# Patient Record
Sex: MALE | URBAN - METROPOLITAN AREA
[De-identification: names, ages, dates, MRNs, and addresses within clinical notes are randomized per-mention and may not be internally consistent; named-entity substitution may affect disease eponyms.]

---

## 2017-09-07 ENCOUNTER — RADIANT APPOINTMENT (OUTPATIENT)
Dept: GENERAL RADIOLOGY | Facility: CLINIC | Age: 32
End: 2017-09-07
Attending: INTERNAL MEDICINE

## 2017-09-07 DIAGNOSIS — R76.11 POSITIVE PPD: ICD-10-CM

## 2017-09-07 PROCEDURE — 99207 XR CHEST 1 VIEW, EMPLOYEE HEALTH: CPT | Performed by: RADIOLOGY

## 2017-10-24 ENCOUNTER — RADIANT APPOINTMENT (OUTPATIENT)
Dept: GENERAL RADIOLOGY | Facility: CLINIC | Age: 32
End: 2017-10-24
Attending: PREVENTIVE MEDICINE
Payer: COMMERCIAL

## 2017-10-24 ENCOUNTER — OFFICE VISIT (OUTPATIENT)
Dept: ORTHOPEDICS | Facility: CLINIC | Age: 32
End: 2017-10-24
Payer: COMMERCIAL

## 2017-10-24 VITALS — WEIGHT: 226.2 LBS | BODY MASS INDEX: 32.38 KG/M2 | HEIGHT: 70 IN

## 2017-10-24 DIAGNOSIS — M54.16 PAIN, RADICULAR, LUMBAR: ICD-10-CM

## 2017-10-24 DIAGNOSIS — M54.50 LUMBAGO: ICD-10-CM

## 2017-10-24 DIAGNOSIS — M54.41 LOW BACK PAIN WITH RIGHT-SIDED SCIATICA, UNSPECIFIED BACK PAIN LATERALITY, UNSPECIFIED CHRONICITY: Primary | ICD-10-CM

## 2017-10-24 PROCEDURE — 72100 X-RAY EXAM L-S SPINE 2/3 VWS: CPT | Performed by: RADIOLOGY

## 2017-10-24 PROCEDURE — 99203 OFFICE O/P NEW LOW 30 MIN: CPT | Performed by: PREVENTIVE MEDICINE

## 2017-10-24 RX ORDER — DICLOFENAC SODIUM 75 MG/1
75 TABLET, DELAYED RELEASE ORAL 2 TIMES DAILY PRN
Qty: 30 TABLET | Refills: 1 | Status: SHIPPED | OUTPATIENT
Start: 2017-10-24 | End: 2018-02-27

## 2017-10-24 NOTE — PROGRESS NOTES
"HISTORY OF PRESENT ILLNESS  Mr. Hodges is a pleasant 32 year old year old male who presents to clinic today with right leg pain and numbness  Sage explains that since this past summer his right leg started to bother him that caused numbness and pain in right leg  Location: low back  Quality:  achy pain    Severity: 5/10 at worst    Duration: 3 months  Timing: occurs intermittently    Modifying factors:  resting and non-use makes it better, movement and use makes it worse  Associated signs & symptoms: right foot numbness and pain in leg  Previous similar pain: yes  Exercise: lifting weights and cardiovascular on machine  Additional history: as documented    MEDICAL HISTORY  There is no problem list on file for this patient.      No current outpatient prescriptions on file.       No Known Allergies    No family history on file.    Additional medical/Social/Surgical histories reviewed in [employer] and updated as appropriate.     REVIEW OF SYSTEMS (10/24/2017)  10 point ROS of systems including Constitutional, Eyes, Respiratory, Cardiovascular, Gastroenterology, Genitourinary, Integumentary, Musculoskeletal, Psychiatric were all negative except for pertinent positives noted in my HPI.     PHYSICAL EXAM  Vitals:    10/24/17 0749   Weight: 102.6 kg (226 lb 3.2 oz)   Height: 1.778 m (5' 10\")     Vital Signs: Ht 1.778 m (5' 10\")  Wt 102.6 kg (226 lb 3.2 oz)  BMI 32.46 kg/m2 Patient declined being weighed. Body mass index is 32.46 kg/(m^2).    General  - normal appearance, in no obvious distress  CV  - normal peripheral perfusion  Pulm  - normal respiratory pattern, non-labored  Musculoskeletal - lumbar spine  - stance: normal gait without limp, no obvious leg length discrepancy, normal heel and toe walk  - inspection: normal bone and joint alignment, no obvious scoliosis  - palpation: no paravertebral or bony tenderness  - ROM: flexion exacerbates pain and is limited, abnormal limited extension, sidebending, rotation  - " strength: lower extremities 5/5 in all planes  - special tests:  (+) straight leg raise- some pain in low back  (+) slump test- pain and stiffness in low back  Neuro  - patellar and Achilles DTRs 2+ bilaterally, right lower extremity sensory deficit throughout L5 distribution, grossly normal coordination, normal muscle tone  Skin  - no ecchymosis, erythema, warmth, or induration, no obvious rash  Psych  - interactive, appropriate, normal mood and affect    ASSESSMENT & PLAN  33 yo male with lumbar pain and ddd  Lumbar xray shows narrowing at L5/s1  Lumbar MRI ordered after discussion due to longevity of symptoms  HEP given  Start voltaren  Consider BEAR    Yohan Gonzalez MD, CAQSM

## 2017-10-24 NOTE — MR AVS SNAPSHOT
After Visit Summary   10/24/2017    Sage Hodges    MRN: [id number]           Patient Information     Date Of Birth          1985        Visit Information        Provider Department      10/24/2017 7:40 AM Yohan Gonzalez MD [facility]        Today's Diagnoses     Low back pain with right-sided sciatica, unspecified back pain laterality, unspecified chronicity    -  1    Pain, radicular, lumbar           Follow-ups after your visit        Your next 10 appointments already scheduled     Nov 06, 2017  5:00 PM CST   MR LUMBAR SPINE W/O CONTRAST with MGMR1   [facility] ([facility]    [address] [address] [place] [id number] [phone]           Take your medicines as usual, unless your doctor tells you not to. Bring a list of your current medicines to your exam (including vitamins, minerals and over-the-counter drugs). Also bring the results of similar scans you may have had.  Please remove any body piercings and hair extensions before you arrive.  Follow your doctor s orders. If you do not, we may have to postpone your exam.  You will not have contrast for this exam. You do not need to do anything special to prepare.  The MRI machine uses a strong magnet. Please wear clothes without metal (snaps, zippers). A sweatsuit works well, or we may give you a hospital gown.   **IMPORTANT** THE INSTRUCTIONS BELOW ARE ONLY FOR THOSE PATIENTS WHO HAVE BEEN TOLD THEY WILL RECEIVE SEDATION OR GENERAL ANESTHESIA DURING THEIR MRI PROCEDURE:  IF YOU WILL RECEIVE SEDATION (take medicine to help you relax during your exam):   You must get the medicine from your doctor before you arrive. Bring the medicine to the exam. Do not take it at home.   Arrive one hour early. Bring someone who can take you home after the test. Your medicine will make you sleepy. After the exam, you may not drive, take a bus or take a taxi by yourself.   No eating 8 hours  before your exam. You may have clear liquids up until 4 hours before your exam. (Clear liquids include water, clear tea, black coffee and fruit juice without pulp.)  IF YOU WILL RECEIVE ANESTHESIA (be asleep for your exam):   Arrive 1 1/2 hours early. Bring someone who can take you home after the test. You may not drive, take a bus or take a taxi by yourself.   No eating 8 hours before your exam. You may have clear liquids up until 4 hours before your exam. (Clear liquids include water, clear tea, black coffee and fruit juice without pulp.)   You will spend four to five hours in the recovery room.  Please call the Imaging Department at your exam site with any questions.            Nov 07, 2017  8:40 AM CST   Return Visit with Yohan Gonzalez MD   [facility] ([facility])    [address] [place] [id number] [phone]              Future tests that were ordered for you today     Open Future Orders        Priority Expected Expires Ordered    MRI Lumbar spine w/o contrast Routine  10/24/2018 10/24/2017            Who to contact     If you have questions or need follow up information about today's clinic visit or your schedule please contact [facility] directly at [phone].  Normal or non-critical lab and imaging results will be communicated to you by Office Depothart, letter or phone within 4 business days after the clinic has received the results. If you do not hear from us within 7 days, please contact the clinic through Office Depothart or phone. If you have a critical or abnormal lab result, we will notify you by phone as soon as possible.  Submit refill requests through Taptu or call your pharmacy and they will forward the refill request to us. Please allow 3 business days for your refill to be completed.          Additional Information About Your Visit        Taptu Information     Taptu is an electronic gateway that provides easy, online access  "to your medical records. With Diversion, you can request a clinic appointment, read your test results, renew a prescription or communicate with your care team.     To sign up for Diversion visit the website at [email or web address]   You will be asked to enter the access code listed below, as well as some personal information. Please follow the directions to create your username and password.     Your access code is: [email or web address]  Expires: 2018  8:43 AM     Your access code will  in 90 days. If you need help or a new code, please contact your [facility] or call [phone] for assistance.        Care EveryWhere ID     This is your Care EveryWhere ID. This could be used by other organizations to access your [facility] medical records  [id number]        Your Vitals Were     Height BMI (Body Mass Index)                1.778 m (5' 10\") 32.46 kg/m2           Blood Pressure from Last 3 Encounters:   No data found for BP    Weight from Last 3 Encounters:   10/24/17 102.6 kg (226 lb 3.2 oz)                 Today's Medication Changes          These changes are accurate as of: 10/24/17  8:46 AM.  If you have any questions, ask your nurse or doctor.               Start taking these medicines.        Dose/Directions    diclofenac 75 MG EC tablet   Commonly known as:  VOLTAREN   Used for:  Low back pain with right-sided sciatica, unspecified back pain laterality, unspecified chronicity, Pain, radicular, lumbar   Started by:  Yohan Gonzalez MD        Dose:  75 mg   Take 1 tablet (75 mg) by mouth 2 times daily as needed for moderate pain   Quantity:  30 tablet   Refills:  1            Where to get your medicines      These medications were sent to [facility] - [place], [place] - [address], [address]  [address], [facility] [address]     Phone:  [phone]     diclofenac 75 MG EC tablet                Primary Care Provider Office " Phone # Fax #    [facility] [phone] [address]  [facility] [address]        Equal Access to Services     JESUS FIGUEROA : Hadjose kia [place] diyao Sofranciscaali, waaxda luqadaha, qaybta kaalmada adesusida, alf blanco laRigobertophuc farrell. So [facility] [phone].    ATENCIÓN: Si habla español, tiene a rockwell disposición servicios gratuitos de asistencia lingüística. Llame al [phone].    We comply with applicable federal civil rights laws and [place] laws. We do not discriminate on the basis of race, color, national origin, age, disability, sex, sexual orientation, or gender identity.            Thank you!     Thank you for choosing [facility]  for your care. Our goal is always to provide you with excellent care. Hearing back from our patients is one way we can continue to improve our services. Please take a few minutes to complete the written survey that you may receive in the mail after your visit with us. Thank you!             Your Updated Medication List - Protect others around you: Learn how to safely use, store and throw away your medicines at www.disposemymeds.org.          This list is accurate as of: 10/24/17  8:46 AM.  Always use your most recent med list.                   Brand Name Dispense Instructions for use Diagnosis    diclofenac 75 MG EC tablet    VOLTAREN    30 tablet    Take 1 tablet (75 mg) by mouth 2 times daily as needed for moderate pain    Low back pain with right-sided sciatica, unspecified back pain laterality, unspecified chronicity, Pain, radicular, lumbar

## 2017-11-06 ENCOUNTER — RADIANT APPOINTMENT (OUTPATIENT)
Dept: MRI IMAGING | Facility: CLINIC | Age: 32
End: 2017-11-06
Attending: PREVENTIVE MEDICINE
Payer: COMMERCIAL

## 2017-11-06 DIAGNOSIS — M54.16 PAIN, RADICULAR, LUMBAR: ICD-10-CM

## 2017-11-06 DIAGNOSIS — M54.41 LOW BACK PAIN WITH RIGHT-SIDED SCIATICA, UNSPECIFIED BACK PAIN LATERALITY, UNSPECIFIED CHRONICITY: ICD-10-CM

## 2017-11-06 PROCEDURE — 72148 MRI LUMBAR SPINE W/O DYE: CPT | Performed by: RADIOLOGY

## 2017-11-07 ENCOUNTER — OFFICE VISIT (OUTPATIENT)
Dept: ORTHOPEDICS | Facility: CLINIC | Age: 32
End: 2017-11-07
Payer: COMMERCIAL

## 2017-11-07 VITALS
DIASTOLIC BLOOD PRESSURE: 102 MMHG | SYSTOLIC BLOOD PRESSURE: 151 MMHG | HEART RATE: 78 BPM | WEIGHT: 226 LBS | BODY MASS INDEX: 32.35 KG/M2 | HEIGHT: 70 IN

## 2017-11-07 DIAGNOSIS — M51.26 HERNIATED LUMBAR INTERVERTEBRAL DISC: Primary | ICD-10-CM

## 2017-11-07 PROCEDURE — 99213 OFFICE O/P EST LOW 20 MIN: CPT | Performed by: PREVENTIVE MEDICINE

## 2017-11-07 ASSESSMENT — PAIN SCALES - GENERAL: PAINLEVEL: SEVERE PAIN (7)

## 2017-11-07 NOTE — PATIENT INSTRUCTIONS
Thanks for coming today.  Ortho/Sports Medicine Clinic  [address] [place] [address]    To schedule future appointments in Ortho Clinic, you may call [phone].    To schedule ordered imaging by your provider:   Call Central Imaging [phone]    To schedule an injection ordered by your provider:  Call Central Imaging Injection scheduling line: [phone]  CartCrunchhart available online at:  [email or web address]    Please call if any further questions or concerns ([phone]).  Clinic hours 8 am to 5 pm.    Return to clinic (call) if symptoms worsen or fail to improve.

## 2017-11-07 NOTE — MR AVS SNAPSHOT
After Visit Summary   2017    Sage Hodges    MRN: [id number]           Patient Information     Date Of Birth          1985        Visit Information        Provider Department      2017 8:40 AM Yohan Gonzalez MD [facility]        Today's Diagnoses     Herniated lumbar intervertebral disc    -  1      Care Instructions    Thanks for coming today.  Ortho/Sports Medicine Clinic  [address] [place] [address]    To schedule future appointments in Ortho Clinic, you may call [phone].    To schedule ordered imaging by your provider:   Call Central Imaging [phone]    To schedule an injection ordered by your provider:  Call Central Imaging Injection scheduling line: [phone]  ClickDiagnosticshart available online at:  [email or web address]    Please call if any further questions or concerns ([phone]).  Clinic hours 8 am to 5 pm.    Return to clinic (call) if symptoms worsen or fail to improve.          Follow-ups after your visit        Your next 10 appointments already scheduled     2017  8:00 AM CST   XR LUMBAR EPIDURAL INJECTION with MGXR3, MG NEURO RAD   [facility] ([facility])    [address] [place] [address] [phone]           For nerve root injection, please send or bring copies of any MRIs or other scans you have had.  Bring a list of your current medicines to your exam. (Include vitamins, minerals and over-the-counter medicines.) Leave your valuables at home.  Plan to have someone drive you home afterward.  Stop taking the following medicines (but talk to your doctor first):   If you take blood thinners, you may need to stop taking them a few days before treatment. Talk to your doctor before stopping these medicines.Stop taking Coumadin (warfarin) 3 days before treatment. Restart the day after treatment.   If you take Plavix, Ticlid, Pletal or Persantine, please  ask your doctor if you should stop these medicines. You may need extra tests on the morning of your scan.   If you take Xarelto (Rivaroxaban), you may need to stop taking it 24 hours before treatment. Talk to your doctor before stopping this medicine. Restart the day after treatment.  You may take your other medicines as normal.  Stop all food and drink (including water) 3 hours before your test or treatment.  Please tell the doctor:   If you are allergic to X-ray dye (contrast fluid).   If you may be pregnant.  Injections take about 30 to 45 minutes. Most people spend up to 2 hours in the clinic or hospital.  Please call the Imaging Department at your exam site with any questions.              Future tests that were ordered for you today     Open Future Orders        Priority Expected Expires Ordered    X-ray Lumbar epidural injection Routine 11/7/2017 11/7/2018 11/7/2017            Who to contact     If you have questions or need follow up information about today's clinic visit or your schedule please contact [facility] directly at [phone].  Normal or non-critical lab and imaging results will be communicated to you by Wonderswamphart, letter or phone within 4 business days after the clinic has received the results. If you do not hear from us within 7 days, please contact the clinic through Into The Glosst or phone. If you have a critical or abnormal lab result, we will notify you by phone as soon as possible.  Submit refill requests through Kijubi or call your pharmacy and they will forward the refill request to us. Please allow 3 business days for your refill to be completed.          Additional Information About Your Visit        Kijubi Information     Kijubi is an electronic gateway that provides easy, online access to your medical records. With Kijubi, you can request a clinic appointment, read your test results, renew a prescription or communicate with your care team.     To sign up for Kijubi  "visit the website at [email or web address]   You will be asked to enter the access code listed below, as well as some personal information. Please follow the directions to create your username and password.     Your access code is: [email or web address]  Expires: 2018  7:43 AM     Your access code will  in 90 days. If you need help or a new code, please contact your [facility] or call [phone] for assistance.        Care EveryWhere ID     This is your Care EveryWhere ID. This could be used by other organizations to access your [facility] medical records  [id number]        Your Vitals Were     Pulse Height BMI (Body Mass Index)             78 1.778 m (5' 10\") 32.43 kg/m2          Blood Pressure from Last 3 Encounters:   17 (!) 151/102    Weight from Last 3 Encounters:   17 102.5 kg (226 lb)   10/24/17 102.6 kg (226 lb 3.2 oz)               Primary Care Provider Office Phone # Fax #    [facility] [phone]       [address]  [facility] [address]        Equal Access to Services     JESUS FIGUEROA AH: Hadii aad ku hadasho Soomaali, waaxda luqadaha, qaybta kaalmada adeegyada, waxay idiin hayaan venkatesh blanco laalexis farrell. So [facility] [phone].    ATENCIÓN: Si habla español, tiene a rockwell disposición servicios gratuitos de asistencia lingüística. Llame al [phone].    We comply with applicable federal civil rights laws and [place] laws. We do not discriminate on the basis of race, color, national origin, age, disability, sex, sexual orientation, or gender identity.            Thank you!     Thank you for choosing [facility]  for your care. Our goal is always to provide you with excellent care. Hearing back from our patients is one way we can continue to improve our services. Please take a few minutes to complete the written survey that you may receive in the mail after your visit with us. Thank you!           "   Your Updated Medication List - Protect others around you: Learn how to safely use, store and throw away your medicines at www.disposemymeds.org.          This list is accurate as of: 11/7/17  9:05 AM.  Always use your most recent med list.                   Brand Name Dispense Instructions for use Diagnosis    diclofenac 75 MG EC tablet    VOLTAREN    30 tablet    Take 1 tablet (75 mg) by mouth 2 times daily as needed for moderate pain    Low back pain with right-sided sciatica, unspecified back pain laterality, unspecified chronicity, Pain, radicular, lumbar

## 2017-11-07 NOTE — PROGRESS NOTES
"HISTORY OF PRESENT ILLNESS  Mr. Hodges returns after having lumbar MRI.  Not much better, voltaren helps a little during the day.  Here to discuss MRI.   And make a plan  Additional history: as documented    MEDICAL HISTORY  There is no problem list on file for this patient.      Current Outpatient Prescriptions   Medication Sig Dispense Refill     diclofenac (VOLTAREN) 75 MG EC tablet Take 1 tablet (75 mg) by mouth 2 times daily as needed for moderate pain 30 tablet 1       No Known Allergies    No family history on file.    Additional medical/Social/Surgical histories reviewed in [facility] and updated as appropriate.     REVIEW OF SYSTEMS (11/7/2017)  10 point ROS of systems including Constitutional, Eyes, Respiratory, Cardiovascular, Gastroenterology, Genitourinary, Integumentary, Musculoskeletal, Psychiatric were all negative except for pertinent positives noted in my HPI.     PHYSICAL EXAM  Vitals:    11/07/17 0845   BP: (!) 151/102   Pulse: 78   Weight: 102.5 kg (226 lb)   Height: 1.778 m (5' 10\")     Vital Signs: BP (!) 151/102  Pulse 78  Ht 1.778 m (5' 10\")  Wt 102.5 kg (226 lb)  BMI 32.43 kg/m2 Patient declined being weighed. Body mass index is 32.43 kg/(m^2).    General  - normal appearance, in no obvious distress  CV  - normal peripheral perfusion  Pulm  - normal respiratory pattern, non-labored  Musculoskeletal - lumbar spine  - stance: normal gait without limp, no obvious leg length discrepancy, normal heel and toe walk  - inspection: normal bone and joint alignment, no obvious scoliosis  - palpation: no paravertebral or bony tenderness  - ROM: flexion exacerbates pain and is limited, abnormal limited extension, sidebending, rotation  - strength: lower extremities 5/5 in all planes  - special tests:  (+) straight leg raise- some pain in low back  (+) slump test- pain and stiffness in low back  Neuro  - patellar and Achilles DTRs 2+ bilaterally, right lower extremity sensory deficit throughout L5 " distribution, grossly normal coordination, normal muscle tone  Skin  - no ecchymosis, erythema, warmth, or induration, no obvious rash  Psych  - interactive, appropriate, normal mood and affect  Not improved    ASSESSMENT & PLAN  33 yo male with lumbar pain and ddd, not improved  MRI lumbar : herniated disc at L4/5  HEP given  Start voltaren  Ordered BEAR    Yohan Gonzalez MD, CAQSM

## 2017-11-09 ENCOUNTER — TELEPHONE (OUTPATIENT)
Dept: ORTHOPEDICS | Facility: CLINIC | Age: 32
End: 2017-11-09

## 2017-11-09 DIAGNOSIS — M51.16 LUMBAR DISC HERNIATION WITH RADICULOPATHY: Primary | ICD-10-CM

## 2017-11-09 NOTE — TELEPHONE ENCOUNTER
[facility] Call Center    Phone Message    Name of Caller: Sage    Phone Number: Home number on file [phone] (home)    Best time to return call: Any    May a detailed message be left on voicemail: yes    Relation to patient: Self    Reason for Call: Other: Patient is wondering if there is an alternative medication that can help him over the weekend with his pain. He has an epdidural scheduled for Tuesday but does not think he can last until then. Please advise.      Action Taken: Message routed to:  Adult Clinics: Sports Medicine p [id number]

## 2017-11-10 RX ORDER — PREDNISONE 20 MG/1
60 TABLET ORAL DAILY
Qty: 15 TABLET | Refills: 0 | Status: SHIPPED | OUTPATIENT
Start: 2017-11-10 | End: 2017-12-05

## 2017-11-14 ENCOUNTER — RADIANT APPOINTMENT (OUTPATIENT)
Dept: GENERAL RADIOLOGY | Facility: CLINIC | Age: 32
End: 2017-11-14
Attending: PREVENTIVE MEDICINE
Payer: COMMERCIAL

## 2017-11-14 VITALS — SYSTOLIC BLOOD PRESSURE: 142 MMHG | HEART RATE: 98 BPM | DIASTOLIC BLOOD PRESSURE: 108 MMHG | OXYGEN SATURATION: 100 %

## 2017-11-14 DIAGNOSIS — M51.26 HERNIATED LUMBAR INTERVERTEBRAL DISC: ICD-10-CM

## 2017-11-14 PROCEDURE — 62323 NJX INTERLAMINAR LMBR/SAC: CPT | Performed by: RADIOLOGY

## 2017-11-14 RX ORDER — IOPAMIDOL 408 MG/ML
10 INJECTION, SOLUTION INTRATHECAL ONCE
Status: COMPLETED | OUTPATIENT
Start: 2017-11-14 | End: 2017-11-14

## 2017-11-14 RX ORDER — METHYLPREDNISOLONE ACETATE 80 MG/ML
80 INJECTION, SUSPENSION INTRA-ARTICULAR; INTRALESIONAL; INTRAMUSCULAR; SOFT TISSUE ONCE
Status: COMPLETED | OUTPATIENT
Start: 2017-11-14 | End: 2017-11-14

## 2017-11-14 RX ORDER — BUPIVACAINE HYDROCHLORIDE 5 MG/ML
10 INJECTION, SOLUTION PERINEURAL ONCE
Status: COMPLETED | OUTPATIENT
Start: 2017-11-14 | End: 2017-11-14

## 2017-11-14 RX ADMIN — IOPAMIDOL 2 ML: 408 INJECTION, SOLUTION INTRATHECAL at 07:53

## 2017-11-14 RX ADMIN — BUPIVACAINE HYDROCHLORIDE 20 MG: 5 INJECTION, SOLUTION PERINEURAL at 07:53

## 2017-11-14 RX ADMIN — METHYLPREDNISOLONE ACETATE 80 MG: 80 INJECTION, SUSPENSION INTRA-ARTICULAR; INTRALESIONAL; INTRAMUSCULAR; SOFT TISSUE at 07:54

## 2017-11-14 NOTE — PROGRESS NOTES
: Sage Hodges was seen in X-ray today for a lumbar epidural injection. Patient rated pain before procedure 3/10. After procedure patient rated pain 2-3/10. This pain level is acceptable to patient. Patient discharged home with an Uber, his Wife dropped him off.      AFTER YOU GO HOME    ? DO relax; minimize your activity for 24 hours  ? You may resume normal activity tomorrow  ? You may remove the bandage in the evening or next morning  ? You may resume bathing the next day  ? Drink at least 4 extra glasses of fluid today if not on fluid restrictions  ? DO NOT drive or operate machinery at home or at work for at least 24 hours      VISIT THE EMERGENCY ROOM OR URGENT CARE IF:    ? There is redness or swelling at the injection site  ? There is discharge from the injection site  ? You develop a temperature of 101  F or greater      ADDITIONAL INSTRUCTIONS:     ? You may resume your Coumadin or other blood thinner at your regular dose today.  Follow up with your physician to have your INR rechecked if indicated.  ? If you gain no relief from the injection after two (2) weeks, follow-up with your provider for your options.        Contacts:    During business hours from 8 to 5 pm, you may call [phone] to reach a nurse advisor at [facility].  After hours, call [facility]  [phone].  Ask for the Radiologist on-call.  Someone is on-call 24 hrs/day.  [facility] Toll Free Number   [phone]

## 2017-12-05 ENCOUNTER — TELEPHONE (OUTPATIENT)
Dept: ORTHOPEDICS | Facility: CLINIC | Age: 32
End: 2017-12-05

## 2017-12-05 DIAGNOSIS — M51.16 LUMBAR DISC HERNIATION WITH RADICULOPATHY: ICD-10-CM

## 2017-12-05 RX ORDER — PREDNISONE 20 MG/1
60 TABLET ORAL DAILY
Qty: 15 TABLET | Refills: 0 | Status: SHIPPED | OUTPATIENT
Start: 2017-12-05 | End: 2018-02-27

## 2017-12-12 ENCOUNTER — TELEPHONE (OUTPATIENT)
Dept: ORTHOPEDICS | Facility: CLINIC | Age: 32
End: 2017-12-12

## 2017-12-12 DIAGNOSIS — M51.26 HERNIATION OF INTERVERTEBRAL DISC OF LUMBAR SPINE: Primary | ICD-10-CM

## 2017-12-12 NOTE — TELEPHONE ENCOUNTER
[facility] Call Center    Phone Message    Name of Caller: Sage    Phone Number: Home number on file [phone] (home)    Best time to return call: any    May a detailed message be left on voicemail: yes    Relation to patient: Self    Reason for Call: Other: patient is requesting to speak with Dr Gonzalez regarding a referral for spinal surgery as a follow up from their last conversation.  patient is requesting to schedule with Dr JOSE MARTIN Zhao- Please advise      Action Taken: Message routed to:  Adult Clinics: Sports Medicine p [phone]

## 2017-12-13 ENCOUNTER — THERAPY VISIT (OUTPATIENT)
Dept: CHIROPRACTIC MEDICINE | Facility: CLINIC | Age: 32
End: 2017-12-13
Payer: COMMERCIAL

## 2017-12-13 DIAGNOSIS — M99.03 SEGMENTAL DYSFUNCTION OF LUMBAR REGION: Primary | ICD-10-CM

## 2017-12-13 DIAGNOSIS — M99.04 SEGMENTAL DYSFUNCTION OF SACRAL REGION: ICD-10-CM

## 2017-12-13 DIAGNOSIS — M62.830 BACK MUSCLE SPASM: ICD-10-CM

## 2017-12-13 DIAGNOSIS — M51.16 LUMBAR DISC HERNIATION WITH RADICULOPATHY: ICD-10-CM

## 2017-12-13 PROCEDURE — 97035 APP MDLTY 1+ULTRASOUND EA 15: CPT | Performed by: CHIROPRACTOR

## 2017-12-13 PROCEDURE — 99203 OFFICE O/P NEW LOW 30 MIN: CPT | Mod: 25 | Performed by: CHIROPRACTOR

## 2017-12-13 PROCEDURE — 98940 CHIROPRACT MANJ 1-2 REGIONS: CPT | Mod: AT | Performed by: CHIROPRACTOR

## 2017-12-13 NOTE — PROGRESS NOTES
"Initial Chiropractic Clinic Visit    PCP: Clinic, [facility]    Sage Hodges is a 32 year old male who is seen  as a self referral presenting with lower back pain that began in July 2017 when he was exercising at home. He is having \"leg issues\" which he describes as pain in his left calf and some numbness and tingling in his left foot, only with standing. He denies weakness in his leg. He rates the pain at 4/10 when it is bothering him. Standing and walking for more than 5 minutes make the pain worse, and goes away with sitting. He is sleeping fine at night. He denies increased pain with coughing or sneezing or changes with bowel and bladder. He saw sports med and took NSAIDs and Prednisone for 5 days, and they didn't help much. He also had an injection at L4-5 and that didn't help for much more than 10 days. This pain began when he was exercising at home. It improved but he has had leg issues off and on since then.  He had never had back or leg issues prior to July 2017.       Injury: None      Duration of Pain: 5 months   Rating of Pain at worst: 6/10  Rating of Pain Currently: 4/10  Symptoms are better with: Sitting  Symptoms are worse with: standing  Additional Features: left calf, numbness in foot       Health History  as reported by the patient:    How does the patient rate their own health:   Good    Current or past medical history:   No red flags identified    Medical allergies:  None    Past Traumas/Surgeries:  ORIF humerus left    Family History:  HTN    Medications:  None    Occupation:  Oncologist    Primary job tasks:   Computer work and Prolonged sitting    Barriers as home/work:   none                Sage was asked to complete the Oswestry Low Back Disability Index and Hua Start Back screening tool, today in the office.  Disability score: 26%. Keel Start Total Score:3 Sub Score: 1     Review of Systems  Musculoskeletal: as above  Remainder of review of systems is negative including " constitutional, CV, pulmonary, GI, Skin and Neurologic except as noted in HPI or medical history.    No past medical history on file.  No past surgical history on file.  Objective  There were no vitals taken for this visit.      GENERAL APPEARANCE: healthy, alert and no distress   GAIT: NORMAL  SKIN: no suspicious lesions or rashes  NEURO: Normal strength and tone, mentation intact and speech normal  PSYCH:  mentation appears normal and affect normal/bright    Low back exam:    Inspection:       no visible deformity in the low back    ROM:       Flexion mildly restricted with pain on left, LR causes pain on left side and mildly reduced     Tender:       Left calf        Strength:       ankle dorsiflexion 4/5 Left       ankle plantarflexion 4/5 Left       dorsiflexion of the great toe 5/5 Normal    Reflexes:       patellar (L3, L4) 2+ bilaterally       achilles tendons (S1) 2+ bilaterally    Sensation:      grossly intact throughout lower extremities    Special tests:  Milgrams - positive, Valsalva - negative, Kemps - Right negative and Left negative, SLR - Right positive, 40 degrees and Left positive, 30 degrees, Fabere - Right negative and Left negative, Yeoman's - Right negative and Left negative, Jasvir - Right negative and Left negative, Ely's - Right negative and Left negative, Heel walk - Right negative and Left negative and Toe walk -  Right negative and Left negative    Segmental spinal dysfunction/restrictions found at:L1 Extension restriction and Flexion restriction  L2 Extension restriction and Flexion restriction  L3 Extension restriction and Flexion restriction  L4 Extension restriction and Flexion restriction  L5 Extension restriction and Flexion restriction  PSIS Left posterior, restricted P to A.        Muscle spasm found in:Lumbar erector spine      Radiology: 11/6/2017 Lumbar MRI  Impression:   1. Disc bulge with superimposed central disc protrusion at L4-5  causing severe thecal sac narrowing and  mild to moderate left neural  foraminal narrowing.  2.  Mildly hypoplastic appearing sacrum without additional findings to  suggest caudal regression syndrome.    Assessment:    No diagnosis found.    RX ordered/plan of care: L4-5 lumbar disc protrusion with severe thecal sac narrowing, with associated myospasm and intersegmental dysfunction.   Anticipated outcomes: Patient is expected to get some relief with care.   Possible risks and side effects: Minimal soreness expected post-adjustment.     After discussing the risk and benefits of care, patient consented to treatment.    Prognosis: Good      Patient's condition:  Patient had restrictions pre-manipulation and Patient had decreased motion prior to manipulation    Treatment effectiveness:  Post manipulation there is better intersegmental movement and Patient claims to feel looser post manipulation      Plan:    Procedures:  Evaluation and Management:  22536 Moderate level exam 30 min    CMT:  67349 Chiropractic manipulative treatment 1-2 regions performed   Lumbar: Flexion Distraction, L1, L2, L3, L4, L5, Prone  Pelvis: Drop Table, PSIS Left , Prone    Modalities:  40846: US:  1.0 Crawley/cm squared for 8 minutes at 1.0mhz  Continuous  pulsed, Location: left lumbar L4-5 region    Therapeutic procedures:  Gave patient Ice instructions post adjustment, and instructions for acute care        Treatment plan and goals:  Goals:  Decrease this episode of acute LBP.  Centralize radicular pain.  Manage L4-5 disc herniation.     Frequency of care  Duration of care is estimated to be 6 weeks, from the initial treatment.  It is estimated that the patient will need a total of 8 visits to resolve this episode.  For the initial therapeutic trial of care, the frequency is recommended at 1-2 times per week.  A reevaluation would be clinically appropriate in 8 visits, to determine progress and further course of care.    In-Office Treatment  Evaluation  Spinal Chiropractic  Manipulative Therapy:  Trial of care - re-evaluate after 8 visits.         Recommendations:    Instructions:ice 20 minutes every other hour as needed    Follow-up:  Return to care in .       Discussed the assessment with the patient.      Disclaimer: This note consists of symbols derived from keyboarding, dictation and/or voice recognition software. As a result, there may be errors in the script that have gone undetected. Please consider this when interpreting information found in this chart.

## 2017-12-14 NOTE — TELEPHONE ENCOUNTER
Spoke to Sage, relayed information below. He will call to schedule appointment with Dr. Zhao.     All questions answered.

## 2017-12-14 NOTE — TELEPHONE ENCOUNTER
Called and left  for patient, 2nd attempt.     Put in spine referral for patient to see Dr. Zhao.     May give the patient phone number for him to schedule with Dr. Zhao if this is what patient would like to go forward with at this time.     [phone].

## 2018-01-02 NOTE — TELEPHONE ENCOUNTER
APPT INFO    Date /Time: 1/16/18 9AM   Reason for Appt: Disc Herniation of L Spine   Ref Provider/Clinic: Dr. Gonzalez   Are there internal records? Yes/No?  IF YES, list clinic names: Yes - [facility]    Imaging in pacs   Are there outside records? Yes/No? no   Patient Contact (Y/N) & Call Details: No - internal referral   Action: Chart reviewed

## 2018-01-16 ENCOUNTER — OFFICE VISIT (OUTPATIENT)
Dept: ORTHOPEDICS | Facility: CLINIC | Age: 33
End: 2018-01-16
Payer: COMMERCIAL

## 2018-01-16 ENCOUNTER — PRE VISIT (OUTPATIENT)
Dept: ORTHOPEDICS | Facility: CLINIC | Age: 33
End: 2018-01-16

## 2018-01-16 VITALS — RESPIRATION RATE: 16 BRPM | HEIGHT: 71 IN | WEIGHT: 230 LBS | BODY MASS INDEX: 32.2 KG/M2

## 2018-01-16 DIAGNOSIS — M54.16 LUMBAR RADICULOPATHY: ICD-10-CM

## 2018-01-16 DIAGNOSIS — M51.26 DISPLACEMENT OF LUMBAR INTERVERTEBRAL DISC WITHOUT MYELOPATHY: ICD-10-CM

## 2018-01-16 DIAGNOSIS — M48.062 SPINAL STENOSIS OF LUMBAR REGION WITH NEUROGENIC CLAUDICATION: Primary | ICD-10-CM

## 2018-01-16 NOTE — LETTER
1/16/2018       RE: Sage Hodges  [address]  [facility] [address]     Dear Colleague,    Thank you for referring your patient, Sage Hodges, to the [facility] at [facility]. Please see a copy of my visit note below.    REASON FOR CONSULTATION: New Patient (Herniation of intervertebral disc of lumbar spine )     REFERRING PHYSICIAN: Yohan Gonzalez   PCP:Clinic, [facility]    History of Present Illness:    32/m, symptoms started summer 2017; no symptoms prior.  Was exercising at home, then developed severe back pain x 2 days before gradually improving on its own.  Leg pain, however, started shortly after - initially R leg, not more on L leg.  Pain and numbness in left L5 pattern (lateral calf, dorsum of foot).  Symptoms have been pretty bad / disabling since November (past 2 months).    Back 0%, Leg 100%,  Left > Right  Worse: standing, walking.  Better: sitting    Previous treatment:   PT - mild relief.  Oral prednisone - mild relief.  1 BEAR - 11/14/17: L4-5 IL BEAR (lEias).  Per report, very mild relief from 3/10 to 2-3/10.  Per patient, only mild ('so-so') relief x 1 week; not dramatic.    At this point, quite unhappy with symptoms.  Activities limited.  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  Willing to consider surgical treatment if condition amenable.        Oswestry (KATALINA) Questionnaire    OSWESTRY DISABILITY INDEX 1/16/2018   Section 1 - Pain intensity 1   Section 2 - Personal care (washing, dressing, etc.)  0   Section 3 - Lifting 1   Section 4 - Walking 2   Section 5 - Sitting 0   Section 6 - Standing 4   Section 7 - Sleeping 0   Section 8 - Sex life (if applicable) 0   Section 9 - Social life 3   Section 10 - Traveling 1   Count 10   Sum 12   Oswestry Score (%) 24   SECTION 1-PAIN INTENSITY The pain is very mild at the moment.   SECTION 2-PERSONAL CARE (WASHING,DRESSING,ETC.)  I can look after myself normally without causing additional pain.   SECTION 3-LIFTING I can lift heavy weights but it gives me additional pain.   SECTION 4-WALKING Pain prevents me from walking more than a quarter of a mile.   SECTION 5-SITTING I can sit in any chair as long as I like.   SECTION 6-STANDING Pain prevents me from standing for more than 10 minutes.   SECTION 7-SLEEPING My sleep is never interrupted by pain.   SECTION 8-SEX LIFE (IF APPLICABLE) My sex life is normal and causes no additional pain.   SECTION 9-SOCIAL LIFE Pain has restricted my social life and I do not go out as often.   SECTION 10-TRAVELING I can travel anywhere but it gives me additional pain.   Oswestry Disability Index: Count 10   KATALINA: Total Score = SUM (total for answered questions) 12   Computed Oswestry Score 24 (%)        Back pain 0, R leg 0, L leg 6.5 (when walking/standing).    PROMIS-10 Scores  Global Mental Health Score: (P) 16  Global Physical Health Score: (P) 15  PROMIS TOTAL - SUBSCORES: (P) 31    ROS:  A 12-point review of systems was completed and is negative except for otherwise noted above in the history of present illness.    Med Hx:  No past medical history on file.  Per patient, good health overall.    Surg Hx:  No past surgical history on file.   (+) ORIF left humerus shaft fx (~2000) - slipped on ice.    Allergies:  No Known Allergies    Meds:  Current Outpatient Prescriptions   Medication     predniSONE (DELTASONE) 20 MG tablet     diclofenac (VOLTAREN) 75 MG EC tablet     No current facility-administered medications for this visit.        Fam Hx:  No family history on file.    P/S Hx:  Social History   Substance Use Topics     Smoking status: Not on file     Smokeless tobacco: Not on file     Alcohol use Not on file     Originally from [place].  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  , lives with wife and 2 1/2 yr ould son.  (-) smoking.  (-) alcohol.      Physical  "Exam:  Very pleasant, healthy appearing, alert, oriented x 3, cooperative.  Normal mood and affect.  Not in cardiorespiratory distress.  Resp 16  Ht 1.803 m (5' 11\")  Wt 104.3 kg (230 lb)  BMI 32.08 kg/m2  Normal upright posture.    Normal gait without assistive device.  No antalgia / imbalance.  Able to walk on toes and on heels with ease.  Back: no deformity, no skin lesions or surgical scars.  No back pain / tenderness.  ROM:   Full painless extension.   Limited flexion, only able to reach down just past knees.     Neuro Exam:  Motor: 5/5 strength for all muscle groups in both LE's.  Limited active knee extension in seated position (aggravates leg pain).  Sensory:  Intact to light touch in both LE's.   Reflexes:  Knee 2+ bilat.  Ankle 1+ bilat.  (-) Babinski, (-) clonus.  (+) seated SLR bilat.    Lower Extremity:  Equal leg lengths, full pulses, (-) atrophy / asymmetry.  Full painless passive knee and ankle motion.  Straight leg raise: (-) right, (+) left.  Hip impingement: (-) right, (-) left.  GAUDENCIO/Erasto's: (-) right, (-) left.            Imaging:   Lumbar MRI 11/6/17 shows congenitally narrow lumbar spinal canal.  At L4-5, there is superimposed central disc protrusion, resulting in severe stenosis at this level.    Lumbar x-rays 10/24/17 do not show fracture, instability, deformity.  Has 5 lumbar vertebrae.    Impression:   - Severe spinal stenosis with central disc protrusion L4-5.  - Multilevel congenital lumbar stenosis.   - L>R L5 radicular pain.    Plan:   Had a very good discussion with Dr. Hodges re his symptoms, my findings and diagnoses, and treatment options.  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  For the most part, this is well-tolerated / asymptomatic.  However, has less physiologic reserve to accommodate further narrowing prior to developing nerve compression symptoms.  Has developed disc protrusion at L4-5, thus resulting in severe narrowing of canal at this level.  This is the " likely cause of his symptoms.  At this point, has had symptoms since the summer, really bad since Nov, limiting his quality of life and activities.  Tried nonop treatment (PT, BEAR, prednisone), with only mild relief.  Surgery reasonable to consider.  I recommend single-level L4-5 MIS laminectomy-diskectomy (left approach).  Goal is to make more room for the nerves.  MIS approach limits collateral muscle damage and is preferable.  Fusion not indicated in absence of instability / deformity.  Prophylactic decompression of other levels not indicated because likely asymptomatic, and only will increase risk of complications or other postlaminectomy sequelae.  Discussed rationale, risks, benefits, alternatives.  He elects to proceed.  Discussed postop: off work minimum 2 weeks, preferably 4 weeks.  No lifting > 10 lbs, avoid excessive bending/twisting x 6 weeks.    Surg request placed.  Outpatient surgery (ASC).  As he is young and healthy, no need for PAC referral.  H&P c/o primary care clinic.    All questions and concerns were answered to the patient's apparent satisfaction before leaving the clinic.     Total visit time > 30 mins, > 50% counseling and coordination of care.      Again, thank you for allowing me to participate in the care of your patient.      Sincerely,    Randal Zhao MD

## 2018-01-16 NOTE — MR AVS SNAPSHOT
After Visit Summary   1/16/2018    Sage Hodges    MRN: [id number]           Patient Information     Date Of Birth          1985        Visit Information        Provider Department      1/16/2018 9:00 AM Randal Zhao MD [facility]        Today's Diagnoses     Spinal stenosis of lumbar region with neurogenic claudication    -  1    Displacement of lumbar intervertebral disc without myelopathy        Lumbar radiculopathy           Follow-ups after your visit        Additional Services     PAC Visit Referral (For [facility] Only)       Does this visit require an Anesthesia consult?  Yes - Evaluate for medical necessity related to one of the following conditions:  preop    H&P done by:  PAC      Please be aware that coverage of these services is subject to the terms and limitations of your health insurance plan.  Call member services at your health plan with any benefit or coverage questions.      Please bring the following to your appointment:  >>   Any x-rays, CTs or MRIs which have been performed.  Contact the facility where they were done to arrange for  prior to your scheduled appointment.  Any new CT, MRI or other procedures ordered by your specialist must be performed at a [facility] facility or coordinated by your clinic's referral office.    >>   List of current medications  >>   This referral request   >>   Any documents/labs given to you for this referral                  Who to contact     Please call your clinic at [phone] to:    Ask questions about your health    Make or cancel appointments    Discuss your medicines    Learn about your test results    Speak to your doctor   If you have compliments or concerns about an experience at your clinic, or if you wish to file a complaint, please contact [facility] Patient Relations at [phone] or email us at [email or web address]         Additional Information About Your  "Visit        STWAhart Information     MacroCure is an electronic gateway that provides easy, online access to your medical records. With MacroCure, you can request a clinic appointment, read your test results, renew a prescription or communicate with your care team.     To sign up for MacroCure visit the website at [email or web address]   You will be asked to enter the access code listed below, as well as some personal information. Please follow the directions to create your username and password.     Your access code is: [email or web address]  Expires: 2018  7:43 AM     Your access code will  in 90 days. If you need help or a new code, please contact your [facility] or call [phone] for assistance.        Care EveryWhere ID     This is your Care EveryWhere ID. This could be used by other organizations to access your [place] medical records  [id number]        Your Vitals Were     Respirations Height BMI (Body Mass Index)             16 1.803 m (5' 11\") 32.08 kg/m2          Blood Pressure from Last 3 Encounters:   17 (!) 142/108   17 (!) 151/102    Weight from Last 3 Encounters:   18 104.3 kg (230 lb)   17 102.5 kg (226 lb)   10/24/17 102.6 kg (226 lb 3.2 oz)              We Performed the Following     PAC Visit Referral (For [facility] Only)     Mariah-Operative Worksheet        Primary Care Provider Office Phone # Fax #    [facility] [phone]       [address]  [facility] [address]        Equal Access to Services     JESUS FIGUEROA : Hadii kia [place] hadashdaniel Sobalaji, waaxda luqadaha, qaybta kaalmada jon, alf farrell. So [facility] [phone].    ATENCIÓN: Si habla español, tiene a rockwell disposición servicios gratuitos de asistencia lingüística. Llame al [phone].    We comply with applicable federal civil rights laws and [place] laws. We do not discriminate on the basis of race, color, " national origin, age, disability, sex, sexual orientation, or gender identity.            Thank you!     Thank you for choosing [facility]  for your care. Our goal is always to provide you with excellent care. Hearing back from our patients is one way we can continue to improve our services. Please take a few minutes to complete the written survey that you may receive in the mail after your visit with us. Thank you!             Your Updated Medication List - Protect others around you: Learn how to safely use, store and throw away your medicines at www.disposemymeds.org.          This list is accurate as of: 1/16/18 11:59 PM.  Always use your most recent med list.                   Brand Name Dispense Instructions for use Diagnosis    diclofenac 75 MG EC tablet    VOLTAREN    30 tablet    Take 1 tablet (75 mg) by mouth 2 times daily as needed for moderate pain    Low back pain with right-sided sciatica, unspecified back pain laterality, unspecified chronicity, Pain, radicular, lumbar       predniSONE 20 MG tablet    DELTASONE    15 tablet    Take 3 tablets (60 mg) by mouth daily    Lumbar disc herniation with radiculopathy

## 2018-01-16 NOTE — NURSING NOTE
"Reason For Visit:   Chief Complaint   Patient presents with     New Patient     Herniation of intervertebral disc of lumbar spine        Primary MD: Clinic, [facility]  Ref. MD: Dr. Gonzalez     ?  No  Occupation: Physician- Oncologist .  Currently working? Yes.  Work status?  Full time.  Date of injury: 07/17  Type of injury: Exercising at home   Date of surgery: N/A  Smoker: No      Resp 16  Ht 1.803 m (5' 11\")  Wt 104.3 kg (230 lb)  BMI 32.08 kg/m2    Pain Assessment  Patient Currently in Pain: No    Oswestry (KATALINA) Questionnaire    OSWESTRY DISABILITY INDEX 1/16/2018   Section 1 - Pain intensity 1   Section 2 - Personal care (washing, dressing, etc.)  0   Section 3 - Lifting 1   Section 4 - Walking 2   Section 5 - Sitting 0   Section 6 - Standing 4   Section 7 - Sleeping 0   Section 8 - Sex life (if applicable) 0   Section 9 - Social life 3   Section 10 - Traveling 1   Count 10   Sum 12   Oswestry Score (%) 24   SECTION 1-PAIN INTENSITY The pain is very mild at the moment.   SECTION 2-PERSONAL CARE (WASHING,DRESSING,ETC.) I can look after myself normally without causing additional pain.   SECTION 3-LIFTING I can lift heavy weights but it gives me additional pain.   SECTION 4-WALKING Pain prevents me from walking more than a quarter of a mile.   SECTION 5-SITTING I can sit in any chair as long as I like.   SECTION 6-STANDING Pain prevents me from standing for more than 10 minutes.   SECTION 7-SLEEPING My sleep is never interrupted by pain.   SECTION 8-SEX LIFE (IF APPLICABLE) My sex life is normal and causes no additional pain.   SECTION 9-SOCIAL LIFE Pain has restricted my social life and I do not go out as often.   SECTION 10-TRAVELING I can travel anywhere but it gives me additional pain.   Oswestry Disability Index: Count 10   KATALINA: Total Score = SUM (total for answered questions) 12   Computed Oswestry Score 24 (%)                Visual Analog Pain Scale  Back Pain Scale 0-10: " 0  Right leg pain: 0  Left leg pain: 6.5 (when walking or standing )    Promis 10 Assessment    PROMIS 10 1/16/2018   In general, would you say your health is: Very good   In general, would you say your quality of life is: Good   In general, how would you rate your physical health? Very good   In general, how would you rate your mental health, including your mood and your ability to think? Excellent   In general, how would you rate your satisfaction with your social activities and relationships? Good   In general, please rate how well you carry out your usual social activities and roles Very good   To what extent are you able to carry out your everyday physical activities such as walking, climbing stairs, carrying groceries, or moving a chair? Moderately   How often have you been bothered by emotional problems such as feeling anxious, depressed or irritable? Never   How would you rate your fatigue on average? None   How would you rate your pain on average?   0 = No Pain  to  10 = Worst Imaginable Pain 6   In general, would you say your health is: 4   In general, would you say your quality of life is: 3   In general, how would you rate your physical health? 4   In general, how would you rate your mental health, including your mood and your ability to think? 5   In general, how would you rate your satisfaction with your social activities and relationships? 3   In general, please rate how well you carry out your usual social activities and roles. (This includes activities at home, at work and in your community, and responsibilities as a parent, child, spouse, employee, friend, etc.) 4   To what extent are you able to carry out your everyday physical activities such as walking, climbing stairs, carrying groceries, or moving a chair? 3   In the past 7 days, how often have you been bothered by emotional problems such as feeling anxious, depressed, or irritable? 1   In the past 7 days, how would you rate your fatigue on  average? 1   In the past 7 days, how would you rate your pain on average, where 0 means no pain, and 10 means worst imaginable pain? 6   Global Mental Health Score 16   Global Physical Health Score 15   PROMIS TOTAL - SUBSCORES 31                Sarah Stevens CMA

## 2018-01-18 NOTE — PROGRESS NOTES
REASON FOR CONSULTATION: New Patient (Herniation of intervertebral disc of lumbar spine )     REFERRING PHYSICIAN: Yohan Gonzalez   PCP:Clinic, [facility]    History of Present Illness:    32/m, symptoms started summer 2017; no symptoms prior.  Was exercising at home, then developed severe back pain x 2 days before gradually improving on its own.  Leg pain, however, started shortly after - initially R leg, not more on L leg.  Pain and numbness in left L5 pattern (lateral calf, dorsum of foot).  Symptoms have been pretty bad / disabling since November (past 2 months).    Back 0%, Leg 100%,  Left > Right  Worse: standing, walking.  Better: sitting    Previous treatment:   PT - mild relief.  Oral prednisone - mild relief.  1 BEAR - 11/14/17: L4-5 IL BEAR (Abielgiuseppe).  Per report, very mild relief from 3/10 to 2-3/10.  Per patient, only mild ('so-so') relief x 1 week; not dramatic.    At this point, quite unhappy with symptoms.  Activities limited.  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  Willing to consider surgical treatment if condition amenable.        Oswestry (KATALINA) Questionnaire    OSWESTRY DISABILITY INDEX 1/16/2018   Section 1 - Pain intensity 1   Section 2 - Personal care (washing, dressing, etc.)  0   Section 3 - Lifting 1   Section 4 - Walking 2   Section 5 - Sitting 0   Section 6 - Standing 4   Section 7 - Sleeping 0   Section 8 - Sex life (if applicable) 0   Section 9 - Social life 3   Section 10 - Traveling 1   Count 10   Sum 12   Oswestry Score (%) 24   SECTION 1-PAIN INTENSITY The pain is very mild at the moment.   SECTION 2-PERSONAL CARE (WASHING,DRESSING,ETC.) I can look after myself normally without causing additional pain.   SECTION 3-LIFTING I can lift heavy weights but it gives me additional pain.   SECTION 4-WALKING Pain prevents me from walking more than a quarter of a mile.   SECTION 5-SITTING I can sit in any chair as long as I like.  "  SECTION 6-STANDING Pain prevents me from standing for more than 10 minutes.   SECTION 7-SLEEPING My sleep is never interrupted by pain.   SECTION 8-SEX LIFE (IF APPLICABLE) My sex life is normal and causes no additional pain.   SECTION 9-SOCIAL LIFE Pain has restricted my social life and I do not go out as often.   SECTION 10-TRAVELING I can travel anywhere but it gives me additional pain.   Oswestry Disability Index: Count 10   KATALINA: Total Score = SUM (total for answered questions) 12   Computed Oswestry Score 24 (%)        Back pain 0, R leg 0, L leg 6.5 (when walking/standing).    PROMIS-10 Scores  Global Mental Health Score: (P) 16  Global Physical Health Score: (P) 15  PROMIS TOTAL - SUBSCORES: (P) 31    ROS:  A 12-point review of systems was completed and is negative except for otherwise noted above in the history of present illness.    Med Hx:  No past medical history on file.  Per patient, good health overall.    Surg Hx:  No past surgical history on file.   (+) ORIF left humerus shaft fx (~2000) - slipped on ice.    Allergies:  No Known Allergies    Meds:  Current Outpatient Prescriptions   Medication     predniSONE (DELTASONE) 20 MG tablet     diclofenac (VOLTAREN) 75 MG EC tablet     No current facility-administered medications for this visit.        Fam Hx:  No family history on file.    P/S Hx:  Social History   Substance Use Topics     Smoking status: Not on file     Smokeless tobacco: Not on file     Alcohol use Not on file     Originally from [place].  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  , lives with wife and 2 1/2 yr ould son.  (-) smoking.  (-) alcohol.      Physical Exam:  Very pleasant, healthy appearing, alert, oriented x 3, cooperative.  Normal mood and affect.  Not in cardiorespiratory distress.  Resp 16  Ht 1.803 m (5' 11\")  Wt 104.3 kg (230 lb)  BMI 32.08 kg/m2  Normal upright posture.    Normal gait without assistive device.  No antalgia / " imbalance.  Able to walk on toes and on heels with ease.  Back: no deformity, no skin lesions or surgical scars.  No back pain / tenderness.  ROM:   Full painless extension.   Limited flexion, only able to reach down just past knees.     Neuro Exam:  Motor: 5/5 strength for all muscle groups in both LE's.  Limited active knee extension in seated position (aggravates leg pain).  Sensory:  Intact to light touch in both LE's.   Reflexes:  Knee 2+ bilat.  Ankle 1+ bilat.  (-) Babinski, (-) clonus.  (+) seated SLR bilat.    Lower Extremity:  Equal leg lengths, full pulses, (-) atrophy / asymmetry.  Full painless passive knee and ankle motion.  Straight leg raise: (-) right, (+) left.  Hip impingement: (-) right, (-) left.  GAUDENCIO/Erasto's: (-) right, (-) left.            Imaging:   Lumbar MRI 11/6/17 shows congenitally narrow lumbar spinal canal.  At L4-5, there is superimposed central disc protrusion, resulting in severe stenosis at this level.    Lumbar x-rays 10/24/17 do not show fracture, instability, deformity.  Has 5 lumbar vertebrae.    Impression:   - Severe spinal stenosis with central disc protrusion L4-5.  - Multilevel congenital lumbar stenosis.   - L>R L5 radicular pain.    Plan:   Had a very good discussion with Dr. Hodges re his symptoms, my findings and diagnoses, and treatment options.  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  For the most part, this is well-tolerated / asymptomatic.  However, has less physiologic reserve to accommodate further narrowing prior to developing nerve compression symptoms.  Has developed disc protrusion at L4-5, thus resulting in severe narrowing of canal at this level.  This is the likely cause of his symptoms.  At this point, has had symptoms since the summer, really bad since Nov, limiting his quality of life and activities.  Tried nonop treatment (PT, BEAR, prednisone), with only mild relief.  Surgery reasonable to consider.  I recommend single-level L4-5 MIS  laminectomy-diskectomy (left approach).  Goal is to make more room for the nerves.  MIS approach limits collateral muscle damage and is preferable.  Fusion not indicated in absence of instability / deformity.  Prophylactic decompression of other levels not indicated because likely asymptomatic, and only will increase risk of complications or other postlaminectomy sequelae.  Discussed rationale, risks, benefits, alternatives.  He elects to proceed.  Discussed postop: off work minimum 2 weeks, preferably 4 weeks.  No lifting > 10 lbs, avoid excessive bending/twisting x 6 weeks.    Surg request placed.  Outpatient surgery (ASC).  As he is young and healthy, no need for PAC referral.  H&P c/o primary care clinic.    All questions and concerns were answered to the patient's apparent satisfaction before leaving the clinic.     Total visit time > 30 mins, > 50% counseling and coordination of care.    Respectfully,    Randal Zhao MD    Orthopaedic Spine Surgery  Dept Orthopaedic Surgery, [facility]  [phone] office, [phone] pager  [email or web address]  Answers for HPI/ROS submitted by the patient on 1/16/2018   General Symptoms: No  Skin Symptoms: No  HENT Symptoms: No  EYE SYMPTOMS: No  HEART SYMPTOMS: No  LUNG SYMPTOMS: No  INTESTINAL SYMPTOMS: No  URINARY SYMPTOMS: No  REPRODUCTIVE SYMPTOMS: No  SKELETAL SYMPTOMS: No  BLOOD SYMPTOMS: No  NERVOUS SYSTEM SYMPTOMS: No  MENTAL HEALTH SYMPTOMS: No

## 2018-01-23 ENCOUNTER — TELEPHONE (OUTPATIENT)
Dept: ORTHOPEDICS | Facility: CLINIC | Age: 33
End: 2018-01-23

## 2018-02-27 ENCOUNTER — APPOINTMENT (OUTPATIENT)
Dept: SURGERY | Facility: CLINIC | Age: 33
End: 2018-02-27
Payer: COMMERCIAL

## 2018-02-27 ENCOUNTER — ANESTHESIA EVENT (OUTPATIENT)
Dept: SURGERY | Facility: AMBULATORY SURGERY CENTER | Age: 33
End: 2018-02-27

## 2018-02-27 ENCOUNTER — ALLIED HEALTH/NURSE VISIT (OUTPATIENT)
Dept: SURGERY | Facility: CLINIC | Age: 33
End: 2018-02-27
Payer: COMMERCIAL

## 2018-02-27 ENCOUNTER — OFFICE VISIT (OUTPATIENT)
Dept: SURGERY | Facility: CLINIC | Age: 33
End: 2018-02-27
Payer: COMMERCIAL

## 2018-02-27 VITALS
OXYGEN SATURATION: 96 % | DIASTOLIC BLOOD PRESSURE: 84 MMHG | HEIGHT: 70 IN | TEMPERATURE: 98.1 F | SYSTOLIC BLOOD PRESSURE: 132 MMHG | WEIGHT: 231.7 LBS | HEART RATE: 87 BPM | BODY MASS INDEX: 33.17 KG/M2 | RESPIRATION RATE: 16 BRPM

## 2018-02-27 DIAGNOSIS — M51.26 LUMBAR HERNIATED DISC: ICD-10-CM

## 2018-02-27 DIAGNOSIS — Z01.818 PREOPERATIVE GENERAL PHYSICAL EXAMINATION: ICD-10-CM

## 2018-02-27 DIAGNOSIS — Z01.818 PREOPERATIVE GENERAL PHYSICAL EXAMINATION: Primary | ICD-10-CM

## 2018-02-27 LAB
ALBUMIN UR-MCNC: NEGATIVE MG/DL
ANION GAP SERPL CALCULATED.3IONS-SCNC: 5 MMOL/L (ref 3–14)
APPEARANCE UR: CLEAR
APTT PPP: 26 SEC (ref 22–37)
BILIRUB UR QL STRIP: NEGATIVE
BUN SERPL-MCNC: 18 MG/DL (ref 7–30)
CALCIUM SERPL-MCNC: 9.3 MG/DL (ref 8.5–10.1)
CHLORIDE SERPL-SCNC: 103 MMOL/L (ref 94–109)
CO2 SERPL-SCNC: 30 MMOL/L (ref 20–32)
COLOR UR AUTO: YELLOW
CREAT SERPL-MCNC: 1.08 MG/DL (ref 0.66–1.25)
ERYTHROCYTE [DISTWIDTH] IN BLOOD BY AUTOMATED COUNT: 11.9 % (ref 10–15)
GFR SERPL CREATININE-BSD FRML MDRD: 79 ML/MIN/1.7M2
GLUCOSE SERPL-MCNC: 94 MG/DL (ref 70–99)
GLUCOSE UR STRIP-MCNC: NEGATIVE MG/DL
HCT VFR BLD AUTO: 53.7 % (ref 40–53)
HGB BLD-MCNC: 18.3 G/DL (ref 13.3–17.7)
HGB UR QL STRIP: NEGATIVE
INR PPP: 0.95 (ref 0.86–1.14)
KETONES UR STRIP-MCNC: NEGATIVE MG/DL
LEUKOCYTE ESTERASE UR QL STRIP: NEGATIVE
MCH RBC QN AUTO: 27.7 PG (ref 26.5–33)
MCHC RBC AUTO-ENTMCNC: 34.1 G/DL (ref 31.5–36.5)
MCV RBC AUTO: 81 FL (ref 78–100)
NITRATE UR QL: NEGATIVE
PH UR STRIP: 6 PH (ref 5–7)
PLATELET # BLD AUTO: 229 10E9/L (ref 150–450)
POTASSIUM SERPL-SCNC: 4.2 MMOL/L (ref 3.4–5.3)
RBC # BLD AUTO: 6.6 10E12/L (ref 4.4–5.9)
SODIUM SERPL-SCNC: 138 MMOL/L (ref 133–144)
SOURCE: NORMAL
SP GR UR STRIP: 1.02 (ref 1–1.03)
UROBILINOGEN UR STRIP-MCNC: 0 MG/DL (ref 0–2)
WBC # BLD AUTO: 10.4 10E9/L (ref 4–11)

## 2018-02-27 NOTE — H&P
Pre-Operative H & P     CC:  Preoperative exam to assess for increased cardiopulmonary risk while undergoing surgery and anesthesia.    Date of Encounter: 2/27/2018  Primary Care Physician:  Nicole, [facility]  Sage Hodges is a 33 year old male who presents for pre-operative H & P in preparation for combined hemilaminectomy Left L4-5, with Dr. Zhao, on 3/20/18, at [facility], in diagnosis and treatment of severe spinal stenosis and herniation intervertebral disc. PAC referral for risk assessment and optimization for anesthesia. The onset was this summer 2017, after home exercise, with decreased ability to walk distance. Pain and parathesia to LLE after few minutes walking.  No urinary sx. No fever, chills.  Positive PPD treated x 9 months with clear follow up Xrays    History is obtained from the patient.     Past Medical History  No Positive PPD, treated x 9 months  Herniated lumbar disc    Past Surgical History  ORIF 2014    Hx of Blood transfusions/reactions: no     Hx of abnormal bleeding or anti-platelet use: no    Menstrual history: No LMP for male patient.    Steroid use in the last year: no    Personal or FH with difficulty with Anesthesia:  no    Prior to Admission Medications  Not currently taking prescription medications. Tylenol prn for pain       Allergies  No Known Allergies    Social History  Social History     Social History     Marital status:      Spouse name: Jim Stoddard     Number of children: 1 healthy     Occupational History     Oncology physician     Social History Main Topics     Smoking status: None     Smokeless tobacco: Not on file     Alcohol use Rare     Drug use: Not on file     Sexual activity: Not on file           Family History  Father alive and well  Mother alive: HTN  Sibling - no medical problems        ROS/MED HX    ENT/Pulmonary: Comment: Tested positive for latent TB 2011; completed 9 months of INH with follow  "up Chest Xray with no abnormality      Neurologic:     (+)other neuro parathesia LLE    Cardiovascular:  - neg cardiovascular ROS       METS/Exercise Tolerance: Comment: 8 months ago had 4 METS  Very limiting to just couple minutes since lumbar pathology    Hematologic:  - neg hematologic  ROS       Musculoskeletal:   (+)  other musculoskeletal- disc with stenosis      GI/Hepatic:  - neg GI/hepatic ROS       Renal/Genitourinary:  - ROS Renal section negative       Endo:  - neg endo ROS       Psychiatric:  - neg psychiatric ROS       Infectious Disease:  - neg infectious disease ROS       Malignancy:      - no malignancy   Other:    (+) No chance of pregnancy no H/O Chronic Pain,no other significant disability        The complete review of systems is negative other than noted in the HPI or here.   Temp: 98.1  F (36.7  C) Temp src: Oral BP: 132/84 Pulse: 87   Resp: 16 SpO2: 96 %         231 lbs 11.2 oz  5' 10\"   Body mass index is 33.25 kg/(m^2).       Physical Exam  Constitutional: Awake, alert, cooperative, no apparent distress, and appears stated age.  Eyes: Pupils equal, round and reactive to light,  sclera clear, conjunctiva normal.  HENT: Normocephalic, oral pharynx with moist mucus membranes, good dentition. No goiter appreciated.   Respiratory: Clear to auscultation bilaterally, no crackles or wheezing.  Cardiovascular: Regular rate and rhythm, normal S1 and S2, and no murmur noted.  Carotids +2, no bruits. No LE edema. Palpable pulses to radial  DP and PT arteries.   GI: Normal bowel sounds, soft, non-distended, non-tender, no masses palpated, no hepatosplenomegaly.    Lymph/Hematologic: No cervical lymphadenopathy and no supraclavicular lymphadenopathy.  Genitourinary:  deferred  Skin: Warm and dry.  No rashes at anticipated surgical site.   Musculoskeletal: Full ROM of neck. There is no redness, warmth, or swelling of the joints. Bruise left UE  Neurologic: Awake, alert, oriented to name, place and time. " Cranial nerves II-XII are grossly intact. Gait is normal.   Neuropsychiatric: Calm, cooperative. Normal affect.     Labs: (personally reviewed)    EKG: Not indicated    ASSESSMENT and PLAN  Sage Hodges is a 33 year old male scheduled to undergo combined hemilaminectomy Left L4-5, with Dr. Zhao, on 3/20/18, in diagnosis and treatment of severe spinal stenosis and herniation intervertebral disc.     Pre-operative considerations include:  1.) Cardiac: Functional status independent. Exercise tolerance > 4 METS. Cards risks: no identifiable.   RCRI = 0.4% risk of adverse cardiac event.  No further cardiac evaluation indicated    2.) Pulmonary: No sx, dx or meds. Elevated BMI - 33    3.) Heme: No bleeds or clots.       . He has the following specific operative considerations:   - RCRI : No serious cardiac risks.  0.4%  risk of major adverse cardiac event.   - Anesthesia considerations:  Refer to PAC assessment in anesthesia records  - THOM # of risks 1/8 = low risk  - Risk of PONV score = 1.  If > 2, anti-emetic intervention recommended.      Patient was discussed with Dr Johnston. Patient is optimized and is acceptable candidate for the proposed procedure.  No further diagnostic evaluation is needed.     JOSE Almaraz  Preoperative Assessment Center  [facility]  Clinic and Surgery Center  Phone: [phone]  Fax: [phone]

## 2018-02-27 NOTE — MR AVS SNAPSHOT
After Visit Summary   2018    Sage Hodges    MRN: [id number]           Patient Information     Date Of Birth          1985        Visit Information        Provider Department      2018 12:30 PM Rn, [facility] Preoperative Assessment Center        Care Instructions    Preparing for Your Surgery      Name:  Sage Hodges   MRN:  [id number]   :  1985   Today's Date:  2018     Arriving for surgery:  Surgery date:  3/20/18  Surgery time:  2:30 PM  Arrival time:  1:00PM  Please come to:     [facility]  [address] [place] [id number]     Parking is available in front of the [facility] building from 5:30AM to 8:00PM.  -  Proceed to the 5th floor to check into the Ambulatory Surgery Center.              >> There will be patient concierges on the 1st and 5th floor, for assistance or an escort, if you would like.              >> Please call [phone] with any questions.    What can I eat or drink? -  You may have solid food or milk products until 8 hours prior to your surgery. No food after 6:30 am on the day of surgery.  -  You may have water, apple juice or 7up/Sprite until 2 hours prior to your surgery. Stop these clear liquids at 12:3- PM on the day of surgery.    Which medicines can I take?  No Aspirin, vitamins or supplements for 7 days before surgery. No Ibuprofen/Aleve for 2 days before surgery.    -  How do I prepare myself?  -  Take two showers: one the night before surgery; and one the morning of surgery.         Use Scrubcare or Hibiclens to wash from neck down.  You may use your own shampoo and conditioner. No other hair products.   -  Do NOT use lotion, powder, deodorant, or antiperspirant the day of your surgery.  -  Do NOT wear any jewelry.  -Do not bring your own medications to the hospital, except for inhalers.  -  Bring your ID and insurance card.    Questions or Concerns:  If you have questions or  concerns, please call the  Preoperative Assessment Center, Monday-Friday 7AM-7PM:  [phone]    AFTER YOUR SURGERY  Breathing exercises   Breathing exercises help you recover faster. Take deep breaths and let the air out slowly. This will:     Help you wake up after surgery.    Help prevent complications like pneumonia.  Preventing complications will help you go home sooner.   We may give you a breathing device (incentive spirometer) to encourage you to breathe deeply.   Nausea and vomiting   You may feel sick to your stomach after surgery; if so, let your nurse know.    Pain control:  After surgery, you may have pain. Our goal is to help you manage your pain. Pain medicine will help you feel comfortable enough to do activities that will help you heal.  These activities may include breathing exercises, walking and physical therapy.   To help your health care team treat your pain we will ask: 1) If you have pain  2) where it is located 3) describe your pain in your words  Methods of pain control include medications given by mouth, vein or by nerve block for some surgeries.  We may give you a pain control pump that will:  1) Deliver the medicine through a tube placed in your vein  2) Control the amount of medicine you receive  3) Allow you to push a button to deliver a dose of pain medicine  Sequential Compression Device (SCD) or Pneumo Boots:  You may need to wear SCD S on your legs or feet. These are wraps connected to a machine that pumps in air and releases it. The repeated pumping helps prevent blood clots from forming.                     Follow-ups after your visit        Your next 10 appointments already scheduled     Feb 27, 2018 12:30 PM CST   (Arrive by 12:15 PM)   PAC RN ASSESSMENT with Marcello Pac Rn   [facility] Preoperative Assessment Center ([facility])    [address] [place] [id number] [phone]            Feb 27, 2018  1:10 PM CST   (Arrive by  12:55 PM)   PAC Anesthesia Consult with  Pac Anesthesiologist   [facility] Preoperative Assessment Center ([facility])    [address] [place] [address]  4th [facility] [id number] [phone]            Feb 27, 2018  1:30 PM CST   LAB with  LAB   [facility] Lab ([facility])    [address] [place] [address]  1st [facility] [id number] [phone]           Please do not eat 10-12 hours before your appointment if you are coming in fasting for labs on lipids, cholesterol, or glucose (sugar). This does not apply to pregnant women. Water, hot tea and black coffee (with nothing added) are okay. Do not drink other fluids, diet soda or chew gum.            Mar 20, 2018   Procedure with Randal Zhao MD   [facility] Surgery and Procedure Center ([facility])    [address] [place] [address]  5th [facility] [id number] [phone]           Located in the Clinics and Surgery Center at [address] [place] [address].   parking is very convenient and highly recommended.  is a $6 flat rate fee.  Both  and self parkers should enter the main arrival plaza from [facility]; parking attendants will direct you based on your parking preference.            Apr 24, 2018  9:30 AM CDT   (Arrive by 9:00 AM)   RETURN SPINE with Randal Zhao MD   [facility] Orthopaedic Clinic ([facility])    [address] [place] [address]  4th [facility] [id number] [phone]              Who to contact     Please call your clinic at [phone] to:    Ask questions about your health    Make or cancel appointments    Discuss your medicines    Learn about your test results    Speak to your doctor            Additional Information About Your Visit        anydooR Information     anydooR is an electronic gateway that provides easy, online access to your medical records. With anydooR, you can request a  clinic appointment, read your test results, renew a prescription or communicate with your care team.     To sign up for UUSEEhart visit the website at [email or web address]   You will be asked to enter the access code listed below, as well as some personal information. Please follow the directions to create your username and password.     Your access code is: [email or web address]  Expires: 2018  6:30 AM     Your access code will  in 90 days. If you need help or a new code, please contact your [facility] or call [phone] for assistance.        Care EveryWhere ID     This is your Care EveryWhere ID. This could be used by other organizations to access your [facility] medical records  [id number]         Blood Pressure from Last 3 Encounters:   18 132/84   17 (!) 142/108   17 (!) 151/102    Weight from Last 3 Encounters:   18 105.1 kg (231 lb 11.2 oz)   18 104.3 kg (230 lb)   17 102.5 kg (226 lb)              Today, you had the following     No orders found for display       Primary Care Provider Office Phone # Fax #    [facility] [phone]       [address]  [facility] [address]        Equal Access to Services     JESUS FIGUEROA : Hadii aad ku hadasho Soomaali, waaxda luqadaha, qaybta kaalmada adeegyada, alf kern . So [facility] [phone].    ATENCIÓN: Si habla español, tiene a rockwell disposición servicios gratuitos de asistencia lingüística. Llame al [phone].    We comply with applicable federal civil rights laws and [place] laws. We do not discriminate on the basis of race, color, national origin, age, disability, sex, sexual orientation, or gender identity.            Thank you!     Thank you for choosing [facility] PREOPERATIVE ASSESSMENT [place]  for your care. Our goal is always to provide you with excellent care. Hearing back from our patients is one way we can  continue to improve our services. Please take a few minutes to complete the written survey that you may receive in the mail after your visit with us. Thank you!             Your Updated Medication List - Protect others around you: Learn how to safely use, store and throw away your medicines at www.disposemymeds.org.      Notice  As of 2/27/2018 12:14 PM    You have not been prescribed any medications.

## 2018-02-27 NOTE — ANESTHESIA PREPROCEDURE EVALUATION
Anesthesia Evaluation     . Pt has had prior anesthetic. Type: General and Regional           ROS/MED HX    ENT/Pulmonary: Comment: Tested positive for latent TB 2011; completed 9 months of INH with follow up Chest Xray with no abnormality      Neurologic:     (+)neuropathy - LLE, denies weakness, other neuro parathesia LLE    Cardiovascular:  - neg cardiovascular ROS       METS/Exercise Tolerance: Comment: 8 months ago had 4 METS  Very limiting to just couple minutes since lumbar pathology    Hematologic:  - neg hematologic  ROS       Musculoskeletal:   (+) , , other musculoskeletal- disc with stenosis      GI/Hepatic:  - neg GI/hepatic ROS       Renal/Genitourinary:  - ROS Renal section negative       Endo:  - neg endo ROS       Psychiatric:  - neg psychiatric ROS       Infectious Disease:  - neg infectious disease ROS       Malignancy:      - no malignancy   Other:    (+) No chance of pregnancy no H/O Chronic Pain,no other significant disability                    Physical Exam  Normal systems: dental    Airway   Mallampati: III  TM distance: >3 FB  Neck ROM: full    Dental     Cardiovascular   Rhythm and rate: regular and normal      Pulmonary    breath sounds clear to auscultation               PAC Discussion and Assessment    ASA Classification: 1  Case is suitable for:   Anesthetic techniques and relevant risks discussed: GA  Invasive monitoring and risk discussed:   Types:   Possibility and Risk of blood transfusion discussed:   NPO instructions given:   Additional anesthetic preparation and risks discussed:   Needs early admission to pre-op area:   Other:     PAC Resident/NP Anesthesia Assessment:  33 year old male for combined hemilaminectomy Left L4-5, with Dr. Zhao, on 3/20/18, in diagnosis and treatment of severe spinal stenosis and herniation intervertebral disc. PAC referral for risk assessment and optimization for anesthesia.   Pre-operative considerations include:  1.) Cardiac: Functional  status independent. Exercise tolerance > 4 METS. Cards risks: no identifiable.   RCRI = 0.4% risk of adverse cardiac event.  No further cardiac evaluation indicated  2.) Pulmonary: No sx, dx or meds. ElevatedBMI @ 33  THOM risk male gender = 1/8 = low risk  3.) Heme: No bleeds or clots.   4.) GI: PONV score: 1 ( 2 or > antiemetic prophylaxis recommended)  Anesthesia: ORIF, 2014, no complications.      Reviewed and Signed by PAC Mid-Level Provider/Resident  Mid-Level Provider/Resident: Rama Leyva PA-C  Date: 2/27/18  Time:     Attending Anesthesiologist Anesthesia Assessment:  I have examined the patient and reviewed the medical record.  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  He has had GA for left humerus fracture (hardware still in place) with no complications    PE:  WNWD male in NAD.  MPC 2-3, 3 FB TMD,  Good neck flexibility.  Lungs clear  CV  RRR without murmur    No further testing necessary per protocol.  Final plan per attending anesthesiologist the day of surgery.      Reviewed and Signed by PAC Anesthesiologist  Anesthesiologist: Alexis Johnston MD  Date: 2/27/2018  Time:   Pass/Fail:   Disposition:     PAC Pharmacist Assessment:        Pharmacist:   Date:   Time:      Anesthesia Plan      History & Physical Review  History and physical reviewed and following examination; no interval change.    ASA Status:  2 .        Plan for General and ETT with Intravenous induction. Maintenance will be Balanced.    PONV prophylaxis:  Dexamethasone or Solumedrol and Ondansetron (or other 5HT-3) (propofol infusion)       Postoperative Care  Postoperative pain management:  Oral pain medications and Multi-modal analgesia.      Consents  Anesthetic plan, risks, benefits and alternatives discussed with:   Patient..        History and physical reviewed; no interval change.     Patient seen, examined, reviewed. See above for details from PAC. Note updated.      Jose Hou  Staff Anesthesiologist  2:49 PM March 20, 2018                        .

## 2018-02-27 NOTE — PATIENT INSTRUCTIONS
Preparing for Your Surgery      Name:  Sage Hodges   MRN:  [id number]   :  1985   Today's Date:  2018     Arriving for surgery:  Surgery date:  3/20/18  Surgery time:  2:30 PM  Arrival time:  1:00PM  Please come to:     [facility]  [address] [place] [phone]     Parking is available in front of the [facility] building from 5:30AM to 8:00PM.  -  Proceed to the 5th floor to check into the Ambulatory Surgery Center.              >> There will be patient concierges on the 1st and 5th floor, for assistance or an escort, if you would like.              >> Please call [phone] with any questions.    What can I eat or drink? -  You may have solid food or milk products until 8 hours prior to your surgery. No food after 6:30 am on the day of surgery.  -  You may have water, apple juice or 7up/Sprite until 2 hours prior to your surgery. Stop these clear liquids at 12:3- PM on the day of surgery.    Which medicines can I take?  No Aspirin, vitamins or supplements for 7 days before surgery. No Ibuprofen/Aleve for 2 days before surgery.    -  How do I prepare myself?  -  Take two showers: one the night before surgery; and one the morning of surgery.         Use Scrubcare or Hibiclens to wash from neck down.  You may use your own shampoo and conditioner. No other hair products.   -  Do NOT use lotion, powder, deodorant, or antiperspirant the day of your surgery.  -  Do NOT wear any jewelry.  -Do not bring your own medications to the hospital, except for inhalers.  -  Bring your ID and insurance card.    Questions or Concerns:  If you have questions or concerns, please call the  Preoperative Assessment Center, Monday-Friday 7AM-7PM:  [phone]    AFTER YOUR SURGERY  Breathing exercises   Breathing exercises help you recover faster. Take deep breaths and let the air out slowly. This will:     Help you wake up after surgery.    Help prevent  complications like pneumonia.  Preventing complications will help you go home sooner.   We may give you a breathing device (incentive spirometer) to encourage you to breathe deeply.   Nausea and vomiting   You may feel sick to your stomach after surgery; if so, let your nurse know.    Pain control:  After surgery, you may have pain. Our goal is to help you manage your pain. Pain medicine will help you feel comfortable enough to do activities that will help you heal.  These activities may include breathing exercises, walking and physical therapy.   To help your health care team treat your pain we will ask: 1) If you have pain  2) where it is located 3) describe your pain in your words  Methods of pain control include medications given by mouth, vein or by nerve block for some surgeries.  We may give you a pain control pump that will:  1) Deliver the medicine through a tube placed in your vein  2) Control the amount of medicine you receive  3) Allow you to push a button to deliver a dose of pain medicine  Sequential Compression Device (SCD) or Pneumo Boots:  You may need to wear SCD S on your legs or feet. These are wraps connected to a machine that pumps in air and releases it. The repeated pumping helps prevent blood clots from forming.

## 2018-03-20 ENCOUNTER — SURGERY (OUTPATIENT)
Age: 33
End: 2018-03-20

## 2018-03-20 ENCOUNTER — RADIANT APPOINTMENT (OUTPATIENT)
Dept: RADIOLOGY | Facility: AMBULATORY SURGERY CENTER | Age: 33
End: 2018-03-20
Attending: ORTHOPAEDIC SURGERY
Payer: COMMERCIAL

## 2018-03-20 ENCOUNTER — ANESTHESIA (OUTPATIENT)
Dept: SURGERY | Facility: AMBULATORY SURGERY CENTER | Age: 33
End: 2018-03-20

## 2018-03-20 ENCOUNTER — HOSPITAL ENCOUNTER (OUTPATIENT)
Facility: AMBULATORY SURGERY CENTER | Age: 33
End: 2018-03-20
Attending: ORTHOPAEDIC SURGERY
Payer: COMMERCIAL

## 2018-03-20 VITALS
DIASTOLIC BLOOD PRESSURE: 89 MMHG | SYSTOLIC BLOOD PRESSURE: 134 MMHG | WEIGHT: 225 LBS | HEART RATE: 87 BPM | RESPIRATION RATE: 16 BRPM | HEIGHT: 70 IN | OXYGEN SATURATION: 93 % | TEMPERATURE: 97.1 F | BODY MASS INDEX: 32.21 KG/M2

## 2018-03-20 DIAGNOSIS — M51.26 LUMBAR HERNIATED DISC: ICD-10-CM

## 2018-03-20 RX ORDER — LIDOCAINE 40 MG/G
CREAM TOPICAL
Status: DISCONTINUED | OUTPATIENT
Start: 2018-03-20 | End: 2018-03-20 | Stop reason: HOSPADM

## 2018-03-20 RX ORDER — HYDROXYZINE HYDROCHLORIDE 25 MG/1
25 TABLET, FILM COATED ORAL EVERY 6 HOURS PRN
Qty: 30 TABLET | Refills: 0 | Status: SHIPPED | OUTPATIENT
Start: 2018-03-20 | End: 2019-07-18

## 2018-03-20 RX ORDER — ONDANSETRON 4 MG/1
4 TABLET, ORALLY DISINTEGRATING ORAL
Status: DISCONTINUED | OUTPATIENT
Start: 2018-03-20 | End: 2018-03-21 | Stop reason: HOSPADM

## 2018-03-20 RX ORDER — EPHEDRINE SULFATE 50 MG/ML
INJECTION, SOLUTION INTRAMUSCULAR; INTRAVENOUS; SUBCUTANEOUS PRN
Status: DISCONTINUED | OUTPATIENT
Start: 2018-03-20 | End: 2018-03-20

## 2018-03-20 RX ORDER — ACETAMINOPHEN 325 MG/1
650 TABLET ORAL
Status: DISCONTINUED | OUTPATIENT
Start: 2018-03-20 | End: 2018-03-21 | Stop reason: HOSPADM

## 2018-03-20 RX ORDER — FENTANYL CITRATE 50 UG/ML
INJECTION, SOLUTION INTRAMUSCULAR; INTRAVENOUS PRN
Status: DISCONTINUED | OUTPATIENT
Start: 2018-03-20 | End: 2018-03-20

## 2018-03-20 RX ORDER — BUPIVACAINE HYDROCHLORIDE AND EPINEPHRINE 2.5; 5 MG/ML; UG/ML
INJECTION, SOLUTION INFILTRATION; PERINEURAL PRN
Status: DISCONTINUED | OUTPATIENT
Start: 2018-03-20 | End: 2018-03-20 | Stop reason: HOSPADM

## 2018-03-20 RX ORDER — KETOROLAC TROMETHAMINE 30 MG/ML
INJECTION, SOLUTION INTRAMUSCULAR; INTRAVENOUS PRN
Status: DISCONTINUED | OUTPATIENT
Start: 2018-03-20 | End: 2018-03-20

## 2018-03-20 RX ORDER — PROPOFOL 10 MG/ML
INJECTION, EMULSION INTRAVENOUS PRN
Status: DISCONTINUED | OUTPATIENT
Start: 2018-03-20 | End: 2018-03-20

## 2018-03-20 RX ORDER — GABAPENTIN 300 MG/1
300 CAPSULE ORAL ONCE
Status: COMPLETED | OUTPATIENT
Start: 2018-03-20 | End: 2018-03-20

## 2018-03-20 RX ORDER — ONDANSETRON 4 MG/1
4 TABLET, ORALLY DISINTEGRATING ORAL EVERY 30 MIN PRN
Status: DISCONTINUED | OUTPATIENT
Start: 2018-03-20 | End: 2018-03-21 | Stop reason: HOSPADM

## 2018-03-20 RX ORDER — SODIUM CHLORIDE, SODIUM LACTATE, POTASSIUM CHLORIDE, CALCIUM CHLORIDE 600; 310; 30; 20 MG/100ML; MG/100ML; MG/100ML; MG/100ML
INJECTION, SOLUTION INTRAVENOUS CONTINUOUS
Status: DISCONTINUED | OUTPATIENT
Start: 2018-03-20 | End: 2018-03-21 | Stop reason: HOSPADM

## 2018-03-20 RX ORDER — PROPOFOL 10 MG/ML
INJECTION, EMULSION INTRAVENOUS CONTINUOUS PRN
Status: DISCONTINUED | OUTPATIENT
Start: 2018-03-20 | End: 2018-03-20

## 2018-03-20 RX ORDER — OXYCODONE HYDROCHLORIDE 5 MG/1
5 TABLET ORAL EVERY 6 HOURS PRN
Qty: 50 TABLET | Refills: 0 | Status: SHIPPED | OUTPATIENT
Start: 2018-03-20 | End: 2018-03-22

## 2018-03-20 RX ORDER — DEXAMETHASONE SODIUM PHOSPHATE 10 MG/ML
INJECTION, SOLUTION INTRAMUSCULAR; INTRAVENOUS PRN
Status: DISCONTINUED | OUTPATIENT
Start: 2018-03-20 | End: 2018-03-20

## 2018-03-20 RX ORDER — SODIUM CHLORIDE, SODIUM LACTATE, POTASSIUM CHLORIDE, CALCIUM CHLORIDE 600; 310; 30; 20 MG/100ML; MG/100ML; MG/100ML; MG/100ML
INJECTION, SOLUTION INTRAVENOUS CONTINUOUS
Status: DISCONTINUED | OUTPATIENT
Start: 2018-03-20 | End: 2018-03-20 | Stop reason: HOSPADM

## 2018-03-20 RX ORDER — ONDANSETRON 2 MG/ML
4 INJECTION INTRAMUSCULAR; INTRAVENOUS EVERY 30 MIN PRN
Status: DISCONTINUED | OUTPATIENT
Start: 2018-03-20 | End: 2018-03-21 | Stop reason: HOSPADM

## 2018-03-20 RX ORDER — LIDOCAINE HYDROCHLORIDE 20 MG/ML
INJECTION, SOLUTION INFILTRATION; PERINEURAL PRN
Status: DISCONTINUED | OUTPATIENT
Start: 2018-03-20 | End: 2018-03-20

## 2018-03-20 RX ORDER — GLYCOPYRROLATE 0.2 MG/ML
INJECTION, SOLUTION INTRAMUSCULAR; INTRAVENOUS PRN
Status: DISCONTINUED | OUTPATIENT
Start: 2018-03-20 | End: 2018-03-20

## 2018-03-20 RX ORDER — ONDANSETRON 2 MG/ML
INJECTION INTRAMUSCULAR; INTRAVENOUS PRN
Status: DISCONTINUED | OUTPATIENT
Start: 2018-03-20 | End: 2018-03-20

## 2018-03-20 RX ORDER — FENTANYL CITRATE 50 UG/ML
25-50 INJECTION, SOLUTION INTRAMUSCULAR; INTRAVENOUS EVERY 10 MIN PRN
Status: DISCONTINUED | OUTPATIENT
Start: 2018-03-20 | End: 2018-03-21 | Stop reason: HOSPADM

## 2018-03-20 RX ORDER — ACETAMINOPHEN 325 MG/1
325-650 TABLET ORAL EVERY 6 HOURS PRN
Qty: 100 TABLET | Refills: 0 | COMMUNITY
Start: 2018-03-20 | End: 2019-07-18

## 2018-03-20 RX ORDER — HYDROXYZINE HYDROCHLORIDE 25 MG/1
25 TABLET, FILM COATED ORAL ONCE
Status: COMPLETED | OUTPATIENT
Start: 2018-03-20 | End: 2018-03-20

## 2018-03-20 RX ORDER — AMOXICILLIN 250 MG
1-2 CAPSULE ORAL 2 TIMES DAILY PRN
Qty: 30 TABLET | Refills: 0 | COMMUNITY
Start: 2018-03-20 | End: 2019-07-18

## 2018-03-20 RX ORDER — OXYCODONE HYDROCHLORIDE 5 MG/1
5 TABLET ORAL
Status: COMPLETED | OUTPATIENT
Start: 2018-03-20 | End: 2018-03-20

## 2018-03-20 RX ORDER — ACETAMINOPHEN 325 MG/1
975 TABLET ORAL ONCE
Status: COMPLETED | OUTPATIENT
Start: 2018-03-20 | End: 2018-03-20

## 2018-03-20 RX ORDER — CEFAZOLIN SODIUM 1 G/3ML
INJECTION, POWDER, FOR SOLUTION INTRAMUSCULAR; INTRAVENOUS PRN
Status: DISCONTINUED | OUTPATIENT
Start: 2018-03-20 | End: 2018-03-20

## 2018-03-20 RX ORDER — NALOXONE HYDROCHLORIDE 0.4 MG/ML
.1-.4 INJECTION, SOLUTION INTRAMUSCULAR; INTRAVENOUS; SUBCUTANEOUS
Status: DISCONTINUED | OUTPATIENT
Start: 2018-03-20 | End: 2018-03-21 | Stop reason: HOSPADM

## 2018-03-20 RX ADMIN — ONDANSETRON 4 MG: 2 INJECTION INTRAMUSCULAR; INTRAVENOUS at 16:03

## 2018-03-20 RX ADMIN — KETOROLAC TROMETHAMINE 30 MG: 30 INJECTION, SOLUTION INTRAMUSCULAR; INTRAVENOUS at 16:07

## 2018-03-20 RX ADMIN — EPHEDRINE SULFATE 5 MG: 50 INJECTION, SOLUTION INTRAMUSCULAR; INTRAVENOUS; SUBCUTANEOUS at 16:14

## 2018-03-20 RX ADMIN — LIDOCAINE HYDROCHLORIDE 100 MG: 20 INJECTION, SOLUTION INFILTRATION; PERINEURAL at 14:26

## 2018-03-20 RX ADMIN — ACETAMINOPHEN 650 MG: 325 TABLET ORAL at 17:23

## 2018-03-20 RX ADMIN — HYDROXYZINE HYDROCHLORIDE 25 MG: 25 TABLET, FILM COATED ORAL at 16:54

## 2018-03-20 RX ADMIN — Medication 200 MCG: at 14:54

## 2018-03-20 RX ADMIN — Medication 50 MG: at 14:27

## 2018-03-20 RX ADMIN — SODIUM CHLORIDE, SODIUM LACTATE, POTASSIUM CHLORIDE, CALCIUM CHLORIDE: 600; 310; 30; 20 INJECTION, SOLUTION INTRAVENOUS at 13:23

## 2018-03-20 RX ADMIN — PROPOFOL 50 MCG/KG/MIN: 10 INJECTION, EMULSION INTRAVENOUS at 14:35

## 2018-03-20 RX ADMIN — CEFAZOLIN SODIUM 2 G: 1 INJECTION, POWDER, FOR SOLUTION INTRAMUSCULAR; INTRAVENOUS at 14:44

## 2018-03-20 RX ADMIN — Medication 200 MCG: at 15:28

## 2018-03-20 RX ADMIN — FENTANYL CITRATE 50 MCG: 50 INJECTION, SOLUTION INTRAMUSCULAR; INTRAVENOUS at 17:17

## 2018-03-20 RX ADMIN — GLYCOPYRROLATE 0.3 MG: 0.2 INJECTION, SOLUTION INTRAMUSCULAR; INTRAVENOUS at 14:24

## 2018-03-20 RX ADMIN — ACETAMINOPHEN 975 MG: 325 TABLET ORAL at 13:21

## 2018-03-20 RX ADMIN — OXYCODONE HYDROCHLORIDE 5 MG: 5 TABLET ORAL at 16:42

## 2018-03-20 RX ADMIN — EPHEDRINE SULFATE 10 MG: 50 INJECTION, SOLUTION INTRAMUSCULAR; INTRAVENOUS; SUBCUTANEOUS at 15:40

## 2018-03-20 RX ADMIN — EPHEDRINE SULFATE 5 MG: 50 INJECTION, SOLUTION INTRAMUSCULAR; INTRAVENOUS; SUBCUTANEOUS at 16:18

## 2018-03-20 RX ADMIN — Medication 200 MCG: at 15:13

## 2018-03-20 RX ADMIN — BUPIVACAINE HYDROCHLORIDE AND EPINEPHRINE 8 ML: 2.5; 5 INJECTION, SOLUTION INFILTRATION; PERINEURAL at 14:55

## 2018-03-20 RX ADMIN — BUPIVACAINE HYDROCHLORIDE AND EPINEPHRINE 22 ML: 2.5; 5 INJECTION, SOLUTION INFILTRATION; PERINEURAL at 16:09

## 2018-03-20 RX ADMIN — FENTANYL CITRATE 50 MCG: 50 INJECTION, SOLUTION INTRAMUSCULAR; INTRAVENOUS at 14:24

## 2018-03-20 RX ADMIN — DEXAMETHASONE SODIUM PHOSPHATE 4 MG: 10 INJECTION, SOLUTION INTRAMUSCULAR; INTRAVENOUS at 14:31

## 2018-03-20 RX ADMIN — PROPOFOL 200 MG: 10 INJECTION, EMULSION INTRAVENOUS at 14:26

## 2018-03-20 RX ADMIN — PROPOFOL 50 MG: 10 INJECTION, EMULSION INTRAVENOUS at 14:56

## 2018-03-20 RX ADMIN — SODIUM CHLORIDE, SODIUM LACTATE, POTASSIUM CHLORIDE, CALCIUM CHLORIDE: 600; 310; 30; 20 INJECTION, SOLUTION INTRAVENOUS at 14:22

## 2018-03-20 RX ADMIN — Medication 100 MCG: at 16:18

## 2018-03-20 RX ADMIN — SODIUM CHLORIDE, SODIUM LACTATE, POTASSIUM CHLORIDE, CALCIUM CHLORIDE: 600; 310; 30; 20 INJECTION, SOLUTION INTRAVENOUS at 15:34

## 2018-03-20 RX ADMIN — GABAPENTIN 300 MG: 300 CAPSULE ORAL at 13:21

## 2018-03-20 RX ADMIN — EPHEDRINE SULFATE 5 MG: 50 INJECTION, SOLUTION INTRAMUSCULAR; INTRAVENOUS; SUBCUTANEOUS at 15:19

## 2018-03-20 RX ADMIN — Medication 100 MCG: at 16:14

## 2018-03-20 NOTE — ANESTHESIA POSTPROCEDURE EVALUATION
Patient: Sage Hodges    Procedure(s):  Minimally Invasive Laminectomy, Left Discectomy Lumbar 4-5 - Wound Class: I-Clean    Diagnosis:Spinal Stenosis, Disc Herniation, Radiculopathy  Diagnosis Additional Information: No value filed.    Anesthesia Type:  No value filed.    Note:  Anesthesia Post Evaluation    Patient location during evaluation: bedside  Patient participation: Able to fully participate in evaluation  Level of consciousness: awake  Pain management: adequate  Airway patency: patent  Cardiovascular status: acceptable  Respiratory status: acceptable  Hydration status: acceptable  PONV: controlled     Anesthetic complications: None          Last vitals:  Vitals:    03/20/18 1655 03/20/18 1719 03/20/18 1726   BP: 155/83     Pulse:      Resp: 16     Temp: 36.7  C (98.1  F)     SpO2: 95% 95% 94%         Electronically Signed By: Aryan Mari MD, MD  March 20, 2018  5:37 PM

## 2018-03-20 NOTE — BRIEF OP NOTE
Date: March 20, 2018    Preoperative Diagnosis: Spinal Stenosis, Disc Herniation, Radiculopathy     Postoperative Diagnosis: same    Procedure(s):  Minimally Invasive Laminectomy, Left Discectomy Lumbar 4-5 - Wound Class: I-Clean    Surgeon: Dr. Zhao     PA: Oksana Morocho     Anesthesia: General     Estimated Blood  Loss: 10 cc    Specimen: none       Complications: none    Condition: Stable    Findings: See full op note for further details    Plan:  WBAT  ADAT  Drain to be removed in 48 hours, nursing staff to contact patient tomorrow for appointment  Pain control  Bowel prophylaxis  DVT prophylaxis: mechanical only  Disposition: home today    I assisted with positioning, prepping and draping, and closure.    Oksana Morocho PA-C  Pager [phone]

## 2018-03-20 NOTE — IP AVS SNAPSHOT
MRN:[id number]                      After Visit Summary   3/20/2018    Sage Hodges    MRN: [id number]           Thank you!     Thank you for choosing [facility] for your care. Our goal is always to provide you with excellent care. Hearing back from our patients is one way we can continue to improve our services. Please take a few minutes to complete the written survey that you may receive in the mail after you visit with us. Thank you!        Patient Information     Date Of Birth          1985        About your hospital stay     You were admitted on:  March 20, 2018 [facility]    You were discharged on:  March 20, 2018       Who to Call     For medical emergencies, please call 911.  For non-urgent questions about your medical care, please call your primary care provider or clinic, [phone]  For questions related to your surgery, please call your surgery clinic        Attending Provider     Provider Specialty    Randal Zhao MD Orthopaedic Surgery       Primary Care Provider Office Phone # Fax #    [facility] [phone]      After Care Instructions      Diet as Tolerated       Return to diet before surgery, unless instructed otherwise.            Discharge Instructions       Review outpatient procedure discharge instructions with patient as directed by Provider            Discharge Instructions - Lifting Limit (specify)       Lifting limit 10 pounds until seen at post-op follow up appointment.  Limit bending and twisting.            Dressing Change       You may start daily dressing changes in 48 hours.  Keep the incision covered and dry for 5-7 days.  The dressings can then be removed, and the incision can get wet.            Ice to affected area       Ice pack to surgical site every 15 minutes per hour for 24 hours            Notify Provider       For signs and symptoms of  infection: Fever greater than 101, redness, swelling, heat at site, drainage, pus.            Return to clinic       Return to clinic in 2 days for drain removal.  You will then return to clinic in approximately six weeks for your first post operative visit with Dr. Zhao.            Shower        Cover dressing if dressing is not going to be changed today            Weight bearing - As tolerated           Wound care       Do not immerse wound in water for the first five weeks after surgery.                  Your next 10 appointments already scheduled     Apr 24, 2018  9:30 AM CDT   (Arrive by 9:00 AM)   RETURN SPINE with Randal Zhao MD   [facility] ([facility])    [address]  4th Floor  [place] [address] [phone]              Further instructions from your care team       [facility]  Home Care Following Anesthesia  For 24 hours after surgery:  1. Get plenty of rest.  A responsible adult must stay with you for at least 24 hours after you leave the surgery center.  2. Do not drive or use heavy equipment.  If you have weakness or tingling, don't drive or use heavy equipment until this feeling goes away.   3. Do not drink alcohol.   4. Avoid strenuous or risky activities.  Ask for help when climbing stairs.  5. You may feel lightheaded.  IF so, sit for a few minutes before standing.  Have someone help you get up.   6. If you have nausea (feel sick to your stomach): Drink only clear liquids such as apple juice, ginger ale, broth or 7-Up.  Rest may also help.  Be sure to drink enough fluids.  Move to a regular diet as you feel able.   7. You may have a slight fever.  Call the doctor if your fever is over 100 F (37.7 C) (taken under the tongue) or lasts longer than 24 hours.  8. You may have a dry mouth, a sore throat, muscle aches or trouble sleeping. These should go away after 24 hours.  9. Do not make  "important or legal decisions.   Today you received a Marcaine or bupivacaine block to numb the nerves near your surgery site.  This is a block using local anesthetic or \"numbing\" medication injected around the nerves to anesthetize or \"numb\" the area supplied by those nerves.  This block is injected into the muscle layer near your surgical site.  The medication may numb the location where you had surgery for 6-18 hours, but may last up to 24 hours.  If your surgical site is an arm or leg you should be careful with your affected limb, since it is possible to injure your limb without being aware of it due to the numbing.  Until full feeling returns, you should guard against bumping or hitting your limb, and avoid extreme hot or cold temperatures on the skin.  As the block wears off, the feeling will return as a tingling or prickly sensation near your surgical site.  You will experience more discomfort from your incision as the feeling returns.  You may want to take a pain pill (a narcotic or Tylenol if this was prescribed by your surgeon) when you start to experience mild pain before the pain beccomes more severe.  If your pain medications do not control your pain you should notifiy your surgeon.    Tips for taking pain medications  To get the best pain relief possible, remember these points:    Take pain medications as directed, before pain becomes severe.    Pain medication can upset your stomach: taking it with food may help.    Constipation is a common side effect of pain medication. Drink plenty of  fluids.    Eat foods high in fiber. Take a stool softener if recommended by your doctor or pharmacist.    Do not drink alcohol, drive or operate machinery while taking pain medications.    Ask about other ways to control pain, such as with heat, ice or relaxation.    Tylenol/Acetaminophen Consumption  To help encourage the safe use of acetaminophen, the makers of TYLENOL  have lowered the maximum daily dose for " "single-ingredient Extra Strength TYLENOL  (acetaminophen) products sold in the [place] from 8 pills per day (4,000 mg) to 6 pills per day (3,000 mg). The dosing interval has also changed from 2 pills every 4-6 hours to 2 pills every 6 hours.    If you feel your pain relief is insufficient, you may take Tylenol/Acetaminophen in addition to your narcotic pain medication.     Be careful not to exceed 3,000 mg of Tylenol/Acetaminophen in a 24 hour period from all sources.    If you are taking extra strength Tylenol/acetaminophen (500 mg), the maximum dose is 6 tablets in 24 hours.    If you are taking regular strength acetaminophen (325 mg), the maximum dose is 9 tablets in 24 hours.    Call a doctor for any of the followin. Signs of infection (fever, growing tenderness at the surgery site, a large amount of drainage or bleeding, severe pain, foul-smelling drainage, redness, swelling).  2. It has been over 8 to 10 hours since surgery and you are still not able to urinate (pass water).  3. Headache for over 24 hours.  Your doctor is:   Dr. Randal Zhao, Orthopaedics: [phone]               Or dial [phone] and ask for the resident on call for:  Orthopaedics  For emergency care, call the:  [facility]: [phone] (TTY for hearing impaired: [phone])      Pending Results     No orders found from 3/18/2018 to 3/21/2018.            Admission Information     Date & Time Provider Department Dept. Phone    3/20/2018 Randal Zhao MD [facility] [phone]      Your Vitals Were     Blood Pressure Pulse Temperature Respirations Height Weight    130/93 87 98.4  F (36.9  C) (Oral) 16 1.778 m (5' 10\") 102.1 kg (225 lb)    Pulse Oximetry BMI (Body Mass Index)                96% 32.28 kg/m2          kajeet Information     kajeet is an electronic gateway that provides easy, online access to your medical records. With kajeet, you can request a clinic " appointment, read your test results, renew a prescription or communicate with your care team.     To sign up for Ac[employer]ist visit the website at [email or web address]   You will be asked to enter the access code listed below, as well as some personal information. Please follow the directions to create your username and password.     Your access code is: [email or web address]  Expires: 2018  7:30 AM     Your access code will  in 90 days. If you need help or a new code, please contact your [facility] or call [phone] for assistance.        Care EveryWhere ID     This is your Care EveryWhere ID. This could be used by other organizations to access your [facility] medical records  [id number]        Equal Access to Services     JESUS FIGUEROA : Chase Del Valle, wacarol almonte, catrachita [facility], alf farrell. So [facility] [phone].    ATENCIÓN: Si habla español, tiene a rockwell disposición servicios gratuitos de asistencia lingüística. Llame al [phone].    We comply with applicable federal civil rights laws and [place] laws. We do not discriminate on the basis of race, color, national origin, age, disability, sex, sexual orientation, or gender identity.               Review of your medicines      START taking        Dose / Directions    acetaminophen 325 MG tablet   Commonly known as:  TYLENOL   Used for:  HNP (herniated nucleus pulposus)        Dose:  325-650 mg   Take 1-2 tablets (325-650 mg) by mouth every 6 hours as needed for other (mild pain)   Quantity:  100 tablet   Refills:  0       hydrOXYzine 25 MG tablet   Commonly known as:  ATARAX   Used for:  HNP (herniated nucleus pulposus)        Dose:  25 mg   Take 1 tablet (25 mg) by mouth every 6 hours as needed for itching (and nausea)   Quantity:  30 tablet   Refills:  0       oxyCODONE IR 5 MG tablet   Commonly known as:  ROXICODONE   Used for:  HNP (herniated nucleus  pulposus)        Dose:  5 mg   Take 1 tablet (5 mg) by mouth every 6 hours as needed for pain or other (Moderate to Severe) Maximum of four tablets daily.   Quantity:  50 tablet   Refills:  0       senna-docusate 8.6-50 MG per tablet   Commonly known as:  SENOKOT-S;PERICOLACE   Used for:  HNP (herniated nucleus pulposus)        Dose:  1-2 tablet   Take 1-2 tablets by mouth 2 times daily as needed for constipation Take while on oral narcotics to prevent or treat constipation.   Quantity:  30 tablet   Refills:  0            Where to get your medicines      Some of these will need a paper prescription and others can be bought over the counter. Ask your nurse if you have questions.     Bring a paper prescription for each of these medications     hydrOXYzine 25 MG tablet    oxyCODONE IR 5 MG tablet       You don't need a prescription for these medications     acetaminophen 325 MG tablet    senna-docusate 8.6-50 MG per tablet                Protect others around you: Learn how to safely use, store and throw away your medicines at www.disposemymeds.org.        Information about OPIOIDS     PRESCRIPTION OPIOIDS: WHAT YOU NEED TO KNOW    Prescription opioids can be used to help relieve moderate to severe pain and are often prescribed following a surgery or injury, or for certain health conditions. These medications can be an important part of treatment but also come with serious risks. It is important to work with your health care provider to make sure you are getting the safest, most effective care.    WHAT ARE THE RISKS AND SIDE EFFECTS OF OPIOID USE?  Prescription opioids carry serious risks of addiction and overdose, especially with prolonged use. An opioid overdose, often marked by slowed breathing can cause sudden death. The use of prescription opioids can have a number of side effects as well, even when taken as directed:      Tolerance - meaning you might need to take more of a medication for the same pain  relief    Physical dependence - meaning you have symptoms of withdrawal when a medication is stopped    Increased sensitivity to pain    Constipation    Nausea, vomiting, and dry mouth    Sleepiness and dizziness    Confusion    Depression    Low levels of testosterone that can result in lower sex drive, energy, and strength    Itching and sweating    RISKS ARE GREATER WITH:    History of drug misuse, substance use disorder, or overdose    Mental health conditions (such as depression or anxiety)    Sleep apnea    Older age (65 years or older)    Pregnancy    Avoid alcohol while taking prescription opioids.   Also, unless specifically advised by your health care provider, medications to avoid include:    Benzodiazepines (such as Xanax or Valium)    Muscle relaxants (such as Soma or Flexeril)    Hypnotics (such as Ambien or Lunesta)    Other prescription opioids    KNOW YOUR OPTIONS:  Talk to your health care provider about ways to manage your pain that do not involve prescription opioids. Some of these options may actually work better and have fewer risks and side effects:    Pain relievers such as acetaminophen, ibuprofen, and naproxen    Some medications that are also used for depression or seizures    Physical therapy and exercise    Cognitive behavioral therapy, a psychological, goal-directed approach, in which patients learn how to modify physical, behavioral, and emotional triggers of pain and stress    IF YOU ARE PRESCRIBED OPIOIDS FOR PAIN:    Never take opioids in greater amounts or more often than prescribed    Follow up with your primary health care provider and work together to create a plan on how to manage your pain.    Talk about ways to help manage your pain that do not involve prescription opioids    Talk about all concerns and side effects    Help prevent misuse and abuse    Never sell or share prescription opioids    Never use another person's prescription opioids    Store prescription opioids in a  secure place and out of reach of others (this may include visitors, children, friends, and family)    Visit www.cdc.gov/drugoverdose to learn about risks of opioid abuse and overdose    If you believe you may be struggling with addiction, tell your health care provider and ask for guidance or call [facility]'s National Helpline at 6-421-641-HELP    LEARN MORE / www.cdc.gov/drugoverdose/prescribing/guideline.html    Safely dispose of unused prescription opioids: Find your local drug take-back programs and more information about the importance of safe disposal at www.doseofreality.mn.gov             Medication List: This is a list of all your medications and when to take them. Check marks below indicate your daily home schedule. Keep this list as a reference.      Medications           Morning Afternoon Evening Bedtime As Needed    acetaminophen 325 MG tablet   Commonly known as:  TYLENOL   Take 1-2 tablets (325-650 mg) by mouth every 6 hours as needed for other (mild pain)   Last time this was given:  975 mg on 3/20/2018  1:21 PM                                hydrOXYzine 25 MG tablet   Commonly known as:  ATARAX   Take 1 tablet (25 mg) by mouth every 6 hours as needed for itching (and nausea)                                oxyCODONE IR 5 MG tablet   Commonly known as:  ROXICODONE   Take 1 tablet (5 mg) by mouth every 6 hours as needed for pain or other (Moderate to Severe) Maximum of four tablets daily.                                senna-docusate 8.6-50 MG per tablet   Commonly known as:  SENOKOT-S;PERICOLACE   Take 1-2 tablets by mouth 2 times daily as needed for constipation Take while on oral narcotics to prevent or treat constipation.

## 2018-03-20 NOTE — OP NOTE
Surgeon:  Randal Zhao MD  Asst:  Oksana Morocho PA-C.  No qualified resident assist available.  Ms. Morocho participated in all aspects of the case, including patient positioning, exposure, localization of levels, approach, decompression-diskectomy and wound closure.      Preop Dx:    - Severe spinal stenosis with superimposed central disc herniation L4-5.  - Left greater than right L5 radicular pain.      Postop Dx:  Same      Procedures:  1.  MIS laminectomy (left approach) with bilateral medial facetectomies and foraminotomies L4-5.  2.  Left diskectomy L4-5.  3.  Use of operating microscope.      Anesthesia:  General  EBL:  10 cc  Complications:  None.    Equipment used:  VasSol MIS system, L4-5 = 20 mm x 7 cm bevelled tubular retractor.  Local anesthetic: 0.25% marcaine + epinephrine = 30cc used.      Indications:  33/m, with chronic L>R low back and leg pain with neurogenic claudication, beginning ~ summer 2017.  Had tried and failed nonoperative treatment, continued to have significant disabling pain.  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  I thus offered MIS decompression with diskectomy L4-5.  Discussed rationale, risks, benefits and alternatives.  Understands that goal is to create more room for the nerves, improve nerve pinching pain.  Patient elected to proceed.      Details of procedure:  Properly identified in preop area, site marked, consent signed.  Wheeled to OR.  Brief earlier performed.  General anesthesia administered, 2 gm IV Ancef given within 1 hour of incision.  No goldberg placed.  Positioned prone on Taj 4poster.  Lumbar region squared off, prepped with Chloraprep, draped in sterile fashion.  Surgical timeout performed.      Skin infiltrated with 10cc anesthetic.  G18 spinal needle inserted right paraspinal ~ L4-5.  Lateral C-arm image  showed needle at L4-5 level.  Hammad planned skin incision just to left of midline centered at L4-5.  1-inch incision made.  Incised fascia just to left of spinous process.  Sequential dilation performed down to L hemilamina, followed by 20 mm x 7 cm tubular retractor, anchored to bed using snake arm.  Soft tissue cleared off bone.  Angled curette placed beneath inferior edge of presumed L4 lamina.  Lateral image confirmed retractor and curette at L4-5, verified by radiologist.  Confirmatory timeout performed.  Microscope brought in.  Left hemilaminectomy performed using watson, completed with 2 mm kerrison.  Ligamentum flavum removed, exposing dural sac.  Medial facetectomy performed by resecting medial portion of L4 descending and L5 ascending processes; carried flush to medial aspect of L5 pedicle.  Decompressed traversing nerve root.  Foramen palpated, noted narrowing.  Foramen decompressed using curved 2 mm kerrison.  Confirmed adequate decompression using Mena probe.  Epidural bleeders controlled using bipolar cautery, surgiflo and cottonoids.  This completed left-sided L4-5 decompression.  Bed and retractor tilted to right.  Performed contralateral decompression by undercutting spinous process and contralateral lamina using watson.  Ligamentum flavum removed using 4 and 5 mm kerrison.  Left medial facetectomy performed using same, carried to contralateral pedicle.  I palpated right foramen, noted narrowing.  Foraminotomy performed using curved 2 mm kerrison.  Confirmed adequate decompression using mena probe.  Epidural bleeders controlled using bipolar, surgiflo and cottonoids.  Irrigation performed.  Applied ~5cc 0.25% local anesthetic in epidural space.  Retractor and microscope removed.      Deep medium Hemovac drain placed.  Closure performed using vicryl 0 for fascia, vicryl 2-0 for subq and monocryl 3-0 for skin.  Additional anesthetic injected into muscle and subq.  Dermabond and sterile dressing  applied.  Tolerated procedure well.  Turned supine, taken off anesthetic, transferred to recovery room in satisfactory condition.      Postop:  Will go home today, with oral pain meds.  No lifting > 10 lbs, avoid excessive bending and twisting of back.  RTC 2 days for drain removal, and 6 wks with lumbar ap-lat standing x-rays.

## 2018-03-20 NOTE — DISCHARGE INSTRUCTIONS
"[facility]  Home Care Following Anesthesia  For 24 hours after surgery:  1. Get plenty of rest.  A responsible adult must stay with you for at least 24 hours after you leave the surgery center.  2. Do not drive or use heavy equipment.  If you have weakness or tingling, don't drive or use heavy equipment until this feeling goes away.   3. Do not drink alcohol.   4. Avoid strenuous or risky activities.  Ask for help when climbing stairs.  5. You may feel lightheaded.  IF so, sit for a few minutes before standing.  Have someone help you get up.   6. If you have nausea (feel sick to your stomach): Drink only clear liquids such as apple juice, ginger ale, broth or 7-Up.  Rest may also help.  Be sure to drink enough fluids.  Move to a regular diet as you feel able.   7. You may have a slight fever.  Call the doctor if your fever is over 100 F (37.7 C) (taken under the tongue) or lasts longer than 24 hours.  8. You may have a dry mouth, a sore throat, muscle aches or trouble sleeping. These should go away after 24 hours.  9. Do not make important or legal decisions.   Today you received a Marcaine or bupivacaine block to numb the nerves near your surgery site.  This is a block using local anesthetic or \"numbing\" medication injected around the nerves to anesthetize or \"numb\" the area supplied by those nerves.  This block is injected into the muscle layer near your surgical site.  The medication may numb the location where you had surgery for 6-18 hours, but may last up to 24 hours.  If your surgical site is an arm or leg you should be careful with your affected limb, since it is possible to injure your limb without being aware of it due to the numbing.  Until full feeling returns, you should guard against bumping or hitting your limb, and avoid extreme hot or cold temperatures on the skin.  As the block wears off, the feeling will return as a tingling or prickly sensation near your " surgical site.  You will experience more discomfort from your incision as the feeling returns.  You may want to take a pain pill (a narcotic or Tylenol if this was prescribed by your surgeon) when you start to experience mild pain before the pain beccomes more severe.  If your pain medications do not control your pain you should notifiy your surgeon.    Tips for taking pain medications  To get the best pain relief possible, remember these points:    Take pain medications as directed, before pain becomes severe.    Pain medication can upset your stomach: taking it with food may help.    Constipation is a common side effect of pain medication. Drink plenty of  fluids.    Eat foods high in fiber. Take a stool softener if recommended by your doctor or pharmacist.    Do not drink alcohol, drive or operate machinery while taking pain medications.    Ask about other ways to control pain, such as with heat, ice or relaxation.    Tylenol/Acetaminophen Consumption  To help encourage the safe use of acetaminophen, the makers of TYLENOL  have lowered the maximum daily dose for single-ingredient Extra Strength TYLENOL  (acetaminophen) products sold in the [place] from 8 pills per day (4,000 mg) to 6 pills per day (3,000 mg). The dosing interval has also changed from 2 pills every 4-6 hours to 2 pills every 6 hours.    If you feel your pain relief is insufficient, you may take Tylenol/Acetaminophen in addition to your narcotic pain medication.     Be careful not to exceed 3,000 mg of Tylenol/Acetaminophen in a 24 hour period from all sources.    If you are taking extra strength Tylenol/acetaminophen (500 mg), the maximum dose is 6 tablets in 24 hours.    If you are taking regular strength acetaminophen (325 mg), the maximum dose is 9 tablets in 24 hours.    Call a doctor for any of the followin. Signs of infection (fever, growing tenderness at the surgery site, a large amount of drainage or bleeding, severe pain, foul-smelling  drainage, redness, swelling).  2. It has been over 8 to 10 hours since surgery and you are still not able to urinate (pass water).  3. Headache for over 24 hours.  Your doctor is:   Dr. Randal Zhao, Orthopaedics: [phone]               Or dial [phone] and ask for the resident on call for:  Orthopaedics  For emergency care, call the:  [facility] Emergency Department: [phone] (TTY for hearing impaired: [phone])

## 2018-03-20 NOTE — IP AVS SNAPSHOT
[facility]    [address] [place] [address]    Phone:  [phone]    Fax:  [phone]                                       After Visit Summary   3/20/2018    Sage Hodges    MRN: [id number]           After Visit Summary Signature Page     I have received my discharge instructions, and my questions have been answered. I have discussed any challenges I see with this plan with the nurse or doctor.    ..........................................................................................................................................  Patient/Patient Representative Signature      ..........................................................................................................................................  Patient Representative Print Name and Relationship to Patient    ..................................................               ................................................  Date                                            Time    ..........................................................................................................................................  Reviewed by Signature/Title    ...................................................              ..............................................  Date                                                            Time

## 2018-03-20 NOTE — ANESTHESIA CARE TRANSFER NOTE
Patient: Sage Hodges    Procedure(s):  Minimally Invasive Laminectomy, Left Discectomy Lumbar 4-5 - Wound Class: I-Clean    Diagnosis: Spinal Stenosis, Disc Herniation, Radiculopathy  Diagnosis Additional Information: No value filed.    Anesthesia Type:   No value filed.     Note:  Airway :Room Air  Patient transferred to:PACU  Comments: Patient awake and breathing spont. VSS. No complaints of nausea or pain. Report to RNHandoff Report: Identifed the Patient, Identified the Reponsible Provider, Reviewed the pertinent medical history, Discussed the surgical course, Reviewed Intra-OP anesthesia mangement and issues during anesthesia, Set expectations for post-procedure period and Allowed opportunity for questions and acknowledgement of understanding      Vitals: (Last set prior to Anesthesia Care Transfer)    CRNA VITALS  3/20/2018 1603 - 3/20/2018 1637      3/20/2018             EKG: Sinus rhythm                Electronically Signed By: MEAGHAN Howard CRNA  March 20, 2018  4:37 PM

## 2018-03-21 DIAGNOSIS — M62.838 MUSCLE SPASM: Primary | ICD-10-CM

## 2018-03-21 RX ORDER — CYCLOBENZAPRINE HCL 10 MG
10 TABLET ORAL 3 TIMES DAILY PRN
Qty: 60 TABLET | Refills: 1 | Status: SHIPPED | OUTPATIENT
Start: 2018-03-21 | End: 2019-07-18

## 2018-03-22 ENCOUNTER — TELEPHONE (OUTPATIENT)
Dept: ORTHOPEDICS | Facility: CLINIC | Age: 33
End: 2018-03-22

## 2018-03-22 ENCOUNTER — ALLIED HEALTH/NURSE VISIT (OUTPATIENT)
Dept: ORTHOPEDICS | Facility: CLINIC | Age: 33
End: 2018-03-22
Payer: COMMERCIAL

## 2018-03-22 DIAGNOSIS — K59.00 CONSTIPATION: Primary | ICD-10-CM

## 2018-03-22 RX ORDER — LACTULOSE 10 G/15ML
10 SOLUTION ORAL; RECTAL 2 TIMES DAILY
Qty: 236 ML | Refills: 0 | Status: SHIPPED | OUTPATIENT
Start: 2018-03-22 | End: 2019-07-18

## 2018-03-22 RX ORDER — OXYCODONE HYDROCHLORIDE 5 MG/1
5 TABLET ORAL EVERY 4 HOURS PRN
Qty: 30 TABLET | Refills: 0 | Status: SHIPPED | OUTPATIENT
Start: 2018-03-22 | End: 2019-07-18

## 2018-03-22 NOTE — TELEPHONE ENCOUNTER
um disability rep calling to clarify work restrictions.  They stated the form had restrictions for patient returning to work and there was no duration noted for these restrictions.  I explained that the restrictions are until he follows up next in clinic, which will be on 4/24/2018, and that his workability will be reassessed at that time.

## 2018-03-22 NOTE — MR AVS SNAPSHOT
After Visit Summary   3/22/2018    Sage Hodges    MRN: [id number]           Patient Information     Date Of Birth          1985        Visit Information        Provider Department      3/22/2018 11:00 AM Nurse, [facility]        Today's Diagnoses     Constipation    -  1    HNP (herniated nucleus pulposus)           Follow-ups after your visit        Your next 10 appointments already scheduled     2018  9:30 AM CDT   (Arrive by 9:00 AM)   RETURN SPINE with Randal Zhao MD   [facility])    [address] [place] [address] [phone]            2018  8:45 AM CDT   (Arrive by 8:15 AM)   RETURN SPINE with Randal Zhao MD   [facility])    [address] [place] [address] [phone]              Who to contact     Please call your clinic at [phone] to:    Ask questions about your health    Make or cancel appointments    Discuss your medicines    Learn about your test results    Speak to your doctor            Additional Information About Your Visit        MyChart Information     Bembat is an electronic gateway that provides easy, online access to your medical records. With [employer], you can request a clinic appointment, read your test results, renew a prescription or communicate with your care team.     To sign up for Bembat visit the website at [email or web address]   You will be asked to enter the access code listed below, as well as some personal information. Please follow the directions to create your username and password.     Your access code is: [email or web address]  Expires: 2018  7:30 AM     Your access code will  in 90 days. If you need help or a new code, please contact your [facility] or call [phone] for  assistance.        Care EveryWhere ID     This is your Care EveryWhere ID. This could be used by other organizations to access your [place] medical records  [id number]         Blood Pressure from Last 3 Encounters:   03/20/18 134/89   02/27/18 132/84   11/14/17 (!) 142/108    Weight from Last 3 Encounters:   03/20/18 102.1 kg (225 lb)   02/27/18 105.1 kg (231 lb 11.2 oz)   01/16/18 104.3 kg (230 lb)              Today, you had the following     No orders found for display         Today's Medication Changes          These changes are accurate as of 3/22/18 12:30 PM.  If you have any questions, ask your nurse or doctor.               Start taking these medicines.        Dose/Directions    lactulose encephalopathy 10 GM/15ML SOLUTION   Commonly known as:  CHRONULAC   Used for:  Constipation   Started by:  Nurse, [facility]        Dose:  10 g   Take 15 mLs (10 g) by mouth 2 times daily   Quantity:  236 mL   Refills:  0         These medicines have changed or have updated prescriptions.        Dose/Directions    oxyCODONE IR 5 MG tablet   Commonly known as:  ROXICODONE   This may have changed:  when to take this   Used for:  HNP (herniated nucleus pulposus)   Changed by:  Nurse, [facility]        Dose:  5 mg   Take 1 tablet (5 mg) by mouth every 4 hours as needed for pain or other (Moderate to Severe) Maximum of four tablets daily.   Quantity:  30 tablet   Refills:  0            Where to get your medicines      These medications were sent to [address] [address]    Hours:  TRANSPLANT PHONE NUMBER [phone] Phone:  [phone]     lactulose encephalopathy 10 GM/15ML SOLUTION         Some of these will need a paper prescription and others can be bought over the counter.  Ask your nurse if you have questions.     Bring a paper prescription for each of these medications     oxyCODONE IR 5 MG tablet                 Primary Care Provider Office Phone # Fax #    [facility] [phone] [address]  [facility] [address]        Equal Access to Services     JESUS FIGUEROA : Hadii aad ku hadgabriellao Soomaali, waaxda luqadaha, qaybta kaalmada ademagi, alf arreguinphuc farrell. So [facility] [phone].    ATENCIÓN: Si habla español, tiene a rockwell disposición servicios gratuitos de asistencia lingüística. Llame al [phone].    We comply with applicable federal civil rights laws and [place] laws. We do not discriminate on the basis of race, color, national origin, age, disability, sex, sexual orientation, or gender identity.            Thank you!     Thank you for choosing [facility]  for your care. Our goal is always to provide you with excellent care. Hearing back from our patients is one way we can continue to improve our services. Please take a few minutes to complete the written survey that you may receive in the mail after your visit with us. Thank you!             Your Updated Medication List - Protect others around you: Learn how to safely use, store and throw away your medicines at www.disposemymeds.org.          This list is accurate as of 3/22/18 12:30 PM.  Always use your most recent med list.                   Brand Name Dispense Instructions for use Diagnosis    acetaminophen 325 MG tablet    TYLENOL    100 tablet    Take 1-2 tablets (325-650 mg) by mouth every 6 hours as needed for other (mild pain)    HNP (herniated nucleus pulposus)       cyclobenzaprine 10 MG tablet    FLEXERIL    60 tablet    Take 1 tablet (10 mg) by mouth 3 times daily as needed for muscle spasms    Muscle spasm       hydrOXYzine 25 MG tablet    ATARAX    30 tablet    Take 1 tablet (25 mg) by mouth every 6 hours as needed for itching (and nausea)    HNP (herniated nucleus pulposus)       lactulose encephalopathy 10 GM/15ML SOLUTION    CHRONULAC    236 mL    Take 15  mLs (10 g) by mouth 2 times daily    Constipation       oxyCODONE IR 5 MG tablet    ROXICODONE    30 tablet    Take 1 tablet (5 mg) by mouth every 4 hours as needed for pain or other (Moderate to Severe) Maximum of four tablets daily.    HNP (herniated nucleus pulposus)       senna-docusate 8.6-50 MG per tablet    SENOKOT-S;PERICOLACE    30 tablet    Take 1-2 tablets by mouth 2 times daily as needed for constipation Take while on oral narcotics to prevent or treat constipation.    HNP (herniated nucleus pulposus)

## 2018-03-22 NOTE — PROGRESS NOTES
Pt seen in clinic today to remove hemovac drain. Pt is 2 days s/p MIS Laminectomy left discectomy lumbar 4-5. Drain was removed by Dr Zhao and new 2x2 gauze placed over drain site along with primapore dressing to cover lumbar incision. Incision is approximated with no drainage or open areas noted.  Pt had c/o constipation and per VORB with Dr Zhao pt was given and Rx for lactulose, along with instruction to continue use of stool softener. Pt also had c/o break through pain with taking 5 mg of oxycodone q 6 hours and will run out of medication over the weekend, so another Rx was written for pt to refill witch reflected new sig of take 1 tab (5mg) oxycodone q 4 hours PRN, pt agrees to continue to wean. All questions were answered and pt will call clinic with any other concerns.

## 2018-03-29 ENCOUNTER — DOCUMENTATION ONLY (OUTPATIENT)
Dept: ORTHOPEDICS | Facility: CLINIC | Age: 33
End: 2018-03-29

## 2018-03-29 NOTE — PROGRESS NOTES
Date Received:  3/13/18 by Nettie WALLS covering for Yvette S  Form Type: UNUM  Doctor: OLVIIA  Status: Nettie completed and fax'd completed form to [phone], got form mailed copy to patient and put to scanning  Elizabeth Kincaid, Admin Coord. II, Orthopaedics

## 2018-04-06 DIAGNOSIS — Z98.890 STATUS POST SPINAL SURGERY: Primary | ICD-10-CM

## 2018-04-24 ENCOUNTER — OFFICE VISIT (OUTPATIENT)
Dept: ORTHOPEDICS | Facility: CLINIC | Age: 33
End: 2018-04-24
Payer: COMMERCIAL

## 2018-04-24 ENCOUNTER — RADIANT APPOINTMENT (OUTPATIENT)
Dept: GENERAL RADIOLOGY | Facility: CLINIC | Age: 33
End: 2018-04-24
Attending: ORTHOPAEDIC SURGERY
Payer: COMMERCIAL

## 2018-04-24 VITALS — HEIGHT: 70 IN | WEIGHT: 225 LBS | BODY MASS INDEX: 32.21 KG/M2

## 2018-04-24 DIAGNOSIS — Z98.890 S/P SPINAL SURGERY: Primary | ICD-10-CM

## 2018-04-24 DIAGNOSIS — Z98.890 STATUS POST SPINAL SURGERY: ICD-10-CM

## 2018-04-24 NOTE — LETTER
"4/24/2018       RE: Sage Hodges  [address]  [facility] [address]     Dear Colleague,    Thank you for referring your patient, Sage Hodges, to the [facility] at [facility]. Please see a copy of my visit note below.    Reason for Visit:  Chief Complaint   Patient presents with     Surgical Followup     DOS 3/20/18 MIS laminectomy, left diskectomy L4-5       S>  33/m, 6 wks postop.    Unaccompanied.  Doing great.  Preop L leg sxs resolved.  Occasionally would have L leg soreness/numbness, but overall much better than preop.  Off opioids.  Back to fulltime work x 2 weeks.  No complaints.      Oswestry (KATALINA) Questionnaire    OSWESTRY DISABILITY INDEX 4/24/2018   Count 10   Sum 3   Oswestry Score (%) 6        Visual Analog Pain Scale  Back Pain Scale 0-10: 0  Right leg pain: 0  Left leg pain: 0    PROMIS-10 Scores  Global Mental Health Score: (P) 17  Global Physical Health Score: (P) 18  PROMIS TOTAL - SUBSCORES: (P) 35    O>   Alert, oriented x 3, cooperative.  Not in CP distress.  Ht 1.778 m (5' 10\")  Wt 102.1 kg (225 lb)  BMI 32.28 kg/m2  Surgical incision (lumbar midline, ~ 1 inch) well-healed, no sign of infection (no redness/fluctuance).  Ambulates independently.   Neuro: 5/5 all, SILT.    Imaging:   EOS lumbar ap-lat shows posthemilaminec change left L4-5.  NO interval instability.    A>  6 wks s/p MIS laminectomy, L diskectomy L4-5; doing excellent.    P>   Congratulated and reassured Dr. Hodges.   May advanced lifting restriction to 20 lbs; at 3 mos, may lift all restrictions.  Advised re common sense approach to activities.  Internal PT order placed (prefers [facility]) - core stabilization, general conditioning, nerve mobilization exercises.  RTC prn.        Randal Zhao MD    Orthopaedic Spine Surgery  Dept Orthopaedic Surgery, [facility]  [phone] office, [phone] " pager  [email or web address]

## 2018-04-24 NOTE — PROGRESS NOTES
"Reason for Visit:  Chief Complaint   Patient presents with     Surgical Followup     DOS 3/20/18 MIS laminectomy, left diskectomy L4-5       S>  33/m, 6 wks postop.    Unaccompanied.  Doing great.  Preop L leg sxs resolved.  Occasionally would have L leg soreness/numbness, but overall much better than preop.  Off opioids.  Back to fulltime work x 2 weeks.  No complaints.      Oswestry (KATALINA) Questionnaire    OSWESTRY DISABILITY INDEX 4/24/2018   Count 10   Sum 3   Oswestry Score (%) 6        Visual Analog Pain Scale  Back Pain Scale 0-10: 0  Right leg pain: 0  Left leg pain: 0    PROMIS-10 Scores  Global Mental Health Score: (P) 17  Global Physical Health Score: (P) 18  PROMIS TOTAL - SUBSCORES: (P) 35    O>   Alert, oriented x 3, cooperative.  Not in CP distress.  Ht 1.778 m (5' 10\")  Wt 102.1 kg (225 lb)  BMI 32.28 kg/m2  Surgical incision (lumbar midline, ~ 1 inch) well-healed, no sign of infection (no redness/fluctuance).  Ambulates independently.   Neuro: 5/5 all, SILT.    Imaging:   EOS lumbar ap-lat shows posthemilaminec change left L4-5.  NO interval instability.    A>  6 wks s/p MIS laminectomy, L diskectomy L4-5; doing excellent.    P>   Congratulated and reassured Dr. Hodges.   May advanced lifting restriction to 20 lbs; at 3 mos, may lift all restrictions.  Advised re common sense approach to activities.  Internal PT order placed (prefers [facility]) - core stabilization, general conditioning, nerve mobilization exercises.  RTC prn.        aRndal Zhao MD    Orthopaedic Spine Surgery  Dept Orthopaedic Surgery, [facility]  [phone] office, [phone] pager  [email or web address]  "

## 2018-04-24 NOTE — MR AVS SNAPSHOT
After Visit Summary   2018    Sage Hodges    MRN: [id number]           Patient Information     Date Of Birth          1985        Visit Information        Provider Department      2018 9:30 AM Randal Zhao MD [facility]        Today's Diagnoses     S/P spinal surgery    -  1       Follow-ups after your visit        Additional Services     PHYSICAL THERAPY REFERRAL (Internal)       Physical Therapy Referral    Eval and treat - may include core stabilization, nerve mobilization exercises.                  Follow-up notes from your care team     Return if symptoms worsen or fail to improve.      Your next 10 appointments already scheduled     2018  8:45 AM CDT   (Arrive by 8:15 AM)   RETURN SPINE with Randal Zhao MD   [facility] ([facility] [place])    [address] [place] [address] [phone]              Who to contact     Please call your clinic at [phone] to:    Ask questions about your health    Make or cancel appointments    Discuss your medicines    Learn about your test results    Speak to your doctor            Additional Information About Your Visit        [employer] Information     Saavn is an electronic gateway that provides easy, online access to your medical records. With Sa[employer]n, you can request a clinic appointment, read your test results, renew a prescription or communicate with your care team.     To sign up for Saavn visit the website at [email or web address]   You will be asked to enter the access code listed below, as well as some personal information. Please follow the directions to create your username and password.     Your access code is: [email or web address]  Expires: 2018  7:30 AM     Your access code will  in 90 days. If you need help or a new code, please contact your [facility] or call [phone] for  "assistance.        Care EveryWhere ID     This is your Care EveryWhere ID. This could be used by other organizations to access your [place] medical records  [id number]        Your Vitals Were     Height BMI (Body Mass Index)                1.778 m (5' 10\") 32.28 kg/m2           Blood Pressure from Last 3 Encounters:   03/20/18 134/89   02/27/18 132/84   11/14/17 (!) 142/108    Weight from Last 3 Encounters:   04/24/18 102.1 kg (225 lb)   03/20/18 102.1 kg (225 lb)   02/27/18 105.1 kg (231 lb 11.2 oz)              We Performed the Following     PHYSICAL THERAPY REFERRAL (Internal)        Primary Care Provider Office Phone # Fax #    [facility] [phone] [address]  [facility] [address]        Equal Access to Services     JESUS FIGUEROA : Hadii kia keaneo Sobalaji, waaxda luqadaha, qaybta kaalmada adeegyada, alf kern . So [facility] [phone].    ATENCIÓN: Si habla español, tiene a rockwell disposición servicios gratuitos de asistencia lingüística. Llame al [phone].    We comply with applicable federal civil rights laws and [place] laws. We do not discriminate on the basis of race, color, national origin, age, disability, sex, sexual orientation, or gender identity.            Thank you!     Thank you for choosing [facility]  for your care. Our goal is always to provide you with excellent care. Hearing back from our patients is one way we can continue to improve our services. Please take a few minutes to complete the written survey that you may receive in the mail after your visit with us. Thank you!             Your Updated Medication List - Protect others around you: Learn how to safely use, store and throw away your medicines at www.disposemymeds.org.          This list is accurate as of 4/24/18 10:59 AM.  Always use your most recent med list.                   Brand Name Dispense Instructions for use Diagnosis "    acetaminophen 325 MG tablet    TYLENOL    100 tablet    Take 1-2 tablets (325-650 mg) by mouth every 6 hours as needed for other (mild pain)    HNP (herniated nucleus pulposus)       cyclobenzaprine 10 MG tablet    FLEXERIL    60 tablet    Take 1 tablet (10 mg) by mouth 3 times daily as needed for muscle spasms    Muscle spasm       hydrOXYzine 25 MG tablet    ATARAX    30 tablet    Take 1 tablet (25 mg) by mouth every 6 hours as needed for itching (and nausea)    HNP (herniated nucleus pulposus)       lactulose encephalopathy 10 GM/15ML SOLUTION    CHRONULAC    236 mL    Take 15 mLs (10 g) by mouth 2 times daily    Constipation       oxyCODONE IR 5 MG tablet    ROXICODONE    30 tablet    Take 1 tablet (5 mg) by mouth every 4 hours as needed for pain or other (Moderate to Severe) Maximum of four tablets daily.    HNP (herniated nucleus pulposus)       senna-docusate 8.6-50 MG per tablet    SENOKOT-S;PERICOLACE    30 tablet    Take 1-2 tablets by mouth 2 times daily as needed for constipation Take while on oral narcotics to prevent or treat constipation.    HNP (herniated nucleus pulposus)

## 2018-04-24 NOTE — NURSING NOTE
"Reason For Visit:   Chief Complaint   Patient presents with     Surgical Followup     DOS 3/20/18 MIS laminectomy, left diskectomy L4-5     Primary MD: Nicole, [facility]  Ref. MD: Dr. Gonzalez      ?  No  Occupation: Physician- Oncologist .  Currently working? Yes.  Work status?  Full time.  Date of injury: 07/17  Type of injury: Exercising at home   Date of surgery: 02/20/18  Type of Surgery: MIS laminectomy, left diskectomy L4-5  Smoker: No    Ht 1.778 m (5' 10\")  Wt 102.1 kg (225 lb)  BMI 32.28 kg/m2    Pain Assessment  Patient Currently in Pain: Denies    Oswestry (KATALINA) Questionnaire    OSWESTRY DISABILITY INDEX 4/24/2018   Count 10   Sum 3   Oswestry Score (%) 6              Visual Analog Pain Scale  Back Pain Scale 0-10: 0  Right leg pain: 0  Left leg pain: 0    Promis 10 Assessment    PROMIS 10 4/24/2018   In general, would you say your health is: Very good   In general, would you say your quality of life is: Very good   In general, how would you rate your physical health? Very good   In general, how would you rate your mental health, including your mood and your ability to think? Very good   In general, how would you rate your satisfaction with your social activities and relationships? Very good   In general, please rate how well you carry out your usual social activities and roles Very good   To what extent are you able to carry out your everyday physical activities such as walking, climbing stairs, carrying groceries, or moving a chair? Mostly   How often have you been bothered by emotional problems such as feeling anxious, depressed or irritable? Never   How would you rate your fatigue on average? None   How would you rate your pain on average?   0 = No Pain  to  10 = Worst Imaginable Pain 0   In general, would you say your health is: 4   In general, would you say your quality of life is: 4   In general, how would you rate your physical health? 4   In general, how would you " rate your mental health, including your mood and your ability to think? 4   In general, how would you rate your satisfaction with your social activities and relationships? 4   In general, please rate how well you carry out your usual social activities and roles. (This includes activities at home, at work and in your community, and responsibilities as a parent, child, spouse, employee, friend, etc.) 4   To what extent are you able to carry out your everyday physical activities such as walking, climbing stairs, carrying groceries, or moving a chair? 4   In the past 7 days, how often have you been bothered by emotional problems such as feeling anxious, depressed, or irritable? 1   In the past 7 days, how would you rate your fatigue on average? 1   In the past 7 days, how would you rate your pain on average, where 0 means no pain, and 10 means worst imaginable pain? 0   Global Mental Health Score 17   Global Physical Health Score 18   PROMIS TOTAL - SUBSCORES 35                Fazal BABCOCK, CMA

## 2018-05-08 ENCOUNTER — THERAPY VISIT (OUTPATIENT)
Dept: PHYSICAL THERAPY | Facility: CLINIC | Age: 33
End: 2018-05-08
Payer: COMMERCIAL

## 2018-05-08 DIAGNOSIS — Z98.890 S/P LUMBAR MICRODISCECTOMY: Primary | ICD-10-CM

## 2018-05-08 PROCEDURE — 97161 PT EVAL LOW COMPLEX 20 MIN: CPT | Mod: GP | Performed by: PHYSICAL THERAPIST

## 2018-05-08 PROCEDURE — 97110 THERAPEUTIC EXERCISES: CPT | Mod: GP | Performed by: PHYSICAL THERAPIST

## 2018-05-08 PROCEDURE — 97530 THERAPEUTIC ACTIVITIES: CPT | Mod: GP | Performed by: PHYSICAL THERAPIST

## 2018-05-08 NOTE — PROGRESS NOTES
[facility] Initial Evaluation -- Lumbar    Date: May 8, 2018  Sage Hodges is a 33 year old male with a lumbar condition.   Referral: Dr. Zhao  Work mechanical stresses:  sitting  Employment status:  Working full time; MD  Leisure mechanical stresses: jogging, conditioning  Functional disability score (KATALINA/STarT Back):  10% / low (0)   VAS score (0-10): 0/10  Patient goals/expectations:  Help him to return to activity without symptoms recurring and help prevent any other episodes of the back pain.      HISTORY:    Present symptoms: Has soreness of the L hamstring after trying to sprint 20 yards, 2 times on 5/5 (tried this to see how the back and L LE felt)  Pain quality (sharp/shooting/stabbing/aching/burning/cramping):  Soreness HS, cramping HS.  No c/o lower back symptoms   Paresthesia (yes/no):  no    Present since (onset date): Minimally Invasive Microdisectomy at L4-5 on March 20, 2018.     Symptoms (improving/unchanging/worsening):  improving.     Symptoms commenced as a result of: working out with videos   Condition occurred in the following environment:   home     Symptoms at onset (back/thigh/leg): back  Constant symptoms (back/thigh/leg): none   Intermittent symptoms (back/thigh/leg): L hamstring    Symptoms are made worse with the following: Other - Lifting (has been avoiding lifting due to restrictions; now able to lift up to 20 #)..  and trial of sprinting   Symptoms are made better with the following: Time of day - No effect and Other - time since surgery    Disturbed sleep (yes/no):  no Sleeping postures (prone/sup/side R/L): supine    Previous episodes (0/1-5/6-10/11+): none Year of first episode: 2017    Previous history: symptoms started in the left lower back and within a couple of days he no longer had back pain but the symptoms began in the L LE.  He was unable to resolve the L LE symptoms.  Previous treatments: chiropractic 1 time prior to surgery without relief.        Specific Questions:  Cough/Sneeze/Strain (pos/neg): negative  Bowel/Bladder (normal/abnormal): normal  Gait (normal/abnormal): normal  Medications (nil/NSAIDS/analg/steroids/anticoag/other):  None  Medical allergies:  none  General health (excellent/good/fair/poor):  good  Pertinent medical history:  None  Imaging (None/Xray/MRI/Other):  MRI  Recent or major surgery (yes/no):  Yes, Microdisectomy L4-5 on 2018.  Night pain (yes/no): no  Accidents (yes/no): no  Unexplained weight loss (yes/no): no  Barriers at home: no  Other red flags: no    EXAMINATION    Posture:   Sitting (good/fair/poor): fair/poor  Standing (good/fair/poor):fair  Lordosis (red/acc/normal): decreased  Correction of posture (better/worse/no effect): NE but support felt good    Lateral Shift (right/left/nil): nil  Relevant (yes/no):  no  Other Observations: well healed incision    Neurological:    Motor deficit:  5/5 throughout B LE  Reflexes:  Unable to elicit for either LE  Sensory deficit:  NT  Dural signs:  (+) on the L with seated SLR and also felt more than a stretch on the R.    Movement Loss:   Josias Mod Min Nil Pain   Flexion  x   NE Finger tips to top of patella    Extension x    NE   Side Gliding R  x x  NE   Side Gliding L   x  NE     Test Movements:   During: produces, abolishes, increases, decreases, no effect, centralizing, peripheralizing   After: better, worse, no better, no worse, no effect, centralized, peripheralized      Pretest symptoms lyin/10 LBP; (+) tension with seated SLR on the L > R.    Symptoms During Symptoms After ROM increased ROM decreased No Effect   NICK          Rep NICK        EIL No Effect    No Effect         Rep EIL No Effect  Increased ROM with reps    No Effect    X  Decreased tension with seated SLR     I    Static Tests:  Sitting slouched:  NT  Sitting erect:  NT   Standing slouched NT  Standing erect:  NT  Lying prone in extension:  3'  NE on symptoms.  Decreased tension with seated SLR  afterwards.   Long sitting:  NT    Other Tests: none    Provisional Classification:  Inconclusive/Other - Post-Surgery    Principle of Management:  Education:  Had patient sit with lumbar roll and discussed importance of good posture for healing and prevention of further episodes.  Discussed that will will look at extension biased ROM over the next week as his LROM improves and he had less tension with seated SLR after the exercises.  Patient to avoid slouched sitting and repetitive bending.  Discussed assessing lumbar EIS or SGIS before, after exercise to make sure no loss of motion   Equipment provided:  Lumbar roll.  Mechanical therapy (Y/N):  yes   Extension principle:  Prone lying in extension 3' followed by pressups 10 reps 3-4 times a day    Lateral Principle:  no  Flexion principle:  no    Other:  Lumbar stabilization strengthening 1 time a day.  Will assess EIS ROM afterwards to make sure there is not a loss of motion.    ASSESSMENT/PLAN:    Patient is a 33 year old male with lumbar complaints.    Patient has the following significant findings with corresponding treatment plan.                Diagnosis 1:  S/p lumbar microdisectomy L4-5    Decreased ROM/flexibility - manual therapy, therapeutic exercise and home program  Decreased joint mobility - manual therapy, therapeutic exercise and home program  Decreased strength - therapeutic exercise, therapeutic activities and home program  Impaired muscle performance - neuro re-education and home program  Decreased function - therapeutic activities and home program  Impaired posture - neuro re-education, therapeutic activities and home program    Therapy Evaluation Codes:   1) History comprised of:   Personal factors that impact the plan of care:      None.    Comorbidity factors that impact the plan of care are:      None.     Medications impacting care: None.  2) Examination of Body Systems comprised of:   Body structures and functions that impact the plan of  care:      Lumbar spine.   Activity limitations that impact the plan of care are:      Bending, Lifting, Running and Sports.  3) Clinical presentation characteristics are:   Stable/Uncomplicated.  4) Decision-Making    Low complexity using standardized patient assessment instrument and/or measureable assessment of functional outcome.  Cumulative Therapy Evaluation is: Low complexity.    Previous and current functional limitations:  (See Goal Flow Sheet for this information)    Short term and Long term goals: (See Goal Flow Sheet for this information)     Communication ability:  Patient appears to be able to clearly communicate and understand verbal and written communication and follow directions correctly.  Treatment Explanation - The following has been discussed with the patient:   RX ordered/plan of care  Anticipated outcomes  Possible risks and side effects  This patient would benefit from PT intervention to resume normal activities.   Rehab potential is excellent.    Frequency:  1 X week, once daily  Duration:  for 8-12 weeks  Discharge Plan:  Achieve all LTG.  Independent in home treatment program.  Reach maximal therapeutic benefit.    Please refer to the daily flowsheet for treatment today, total treatment time and time spent performing 1:1 timed codes.

## 2018-05-15 ENCOUNTER — THERAPY VISIT (OUTPATIENT)
Dept: PHYSICAL THERAPY | Facility: CLINIC | Age: 33
End: 2018-05-15
Payer: COMMERCIAL

## 2018-05-15 DIAGNOSIS — Z98.890 S/P LUMBAR MICRODISCECTOMY: ICD-10-CM

## 2018-05-15 PROCEDURE — 97110 THERAPEUTIC EXERCISES: CPT | Mod: GP | Performed by: PHYSICAL THERAPIST

## 2018-05-15 PROCEDURE — 97112 NEUROMUSCULAR REEDUCATION: CPT | Mod: GP | Performed by: PHYSICAL THERAPIST

## 2018-05-21 ENCOUNTER — THERAPY VISIT (OUTPATIENT)
Dept: PHYSICAL THERAPY | Facility: CLINIC | Age: 33
End: 2018-05-21
Payer: COMMERCIAL

## 2018-05-21 DIAGNOSIS — Z98.890 S/P LUMBAR MICRODISCECTOMY: ICD-10-CM

## 2018-05-21 PROCEDURE — 97110 THERAPEUTIC EXERCISES: CPT | Mod: GP | Performed by: PHYSICAL THERAPIST

## 2018-05-21 PROCEDURE — 97530 THERAPEUTIC ACTIVITIES: CPT | Mod: GP | Performed by: PHYSICAL THERAPIST

## 2018-06-04 ENCOUNTER — THERAPY VISIT (OUTPATIENT)
Dept: PHYSICAL THERAPY | Facility: CLINIC | Age: 33
End: 2018-06-04
Payer: COMMERCIAL

## 2018-06-04 DIAGNOSIS — Z98.890 S/P LUMBAR MICRODISCECTOMY: ICD-10-CM

## 2018-06-04 PROCEDURE — 97110 THERAPEUTIC EXERCISES: CPT | Mod: GP | Performed by: PHYSICAL THERAPIST

## 2018-06-11 ENCOUNTER — THERAPY VISIT (OUTPATIENT)
Dept: PHYSICAL THERAPY | Facility: CLINIC | Age: 33
End: 2018-06-11
Payer: COMMERCIAL

## 2018-06-11 DIAGNOSIS — Z98.890 S/P LUMBAR MICRODISCECTOMY: ICD-10-CM

## 2018-06-11 PROCEDURE — 97110 THERAPEUTIC EXERCISES: CPT | Mod: GP | Performed by: PHYSICAL THERAPIST

## 2018-06-11 NOTE — MR AVS SNAPSHOT
"              After Visit Summary   6/11/2018    Sage Hodges    MRN: [id number]           Patient Information     Date Of Birth          1985        Visit Information        Provider Department      6/11/2018 4:50 PM April Smith PT [facility] Physical Therapy        Today's Diagnoses     S/P lumbar microdiscectomy           Follow-ups after your visit        Your next 10 appointments already scheduled     Jun 12, 2018  8:45 AM CDT   (Arrive by 8:15 AM)   RETURN SPINE with Randal Zhao MD   [facility] ([facility])    [address] [place] [address]  4th [facility] [address] [phone]              Who to contact     If you have questions or need follow up information about today's clinic visit or your schedule please contact [facility] PHYSICAL THERAPY directly at [phone].  Normal or non-critical lab and imaging results will be communicated to you by MyChart, letter or phone within 4 business days after the clinic has received the results. If you do not hear from us within 7 days, please contact the clinic through MyChart or phone. If you have a critical or abnormal lab result, we will notify you by phone as soon as possible.  Submit refill requests through [employer] or call your pharmacy and they will forward the refill request to us. Please allow 3 business days for your refill to be completed.          Additional Information About Your Visit        MyChart Information     [employer] lets you send messages to your doctor, view your test results, renew your prescriptions, schedule appointments and more. To sign up, go to [email or web address] . Click on \"Log in\" on the left side of the screen, which will take you to the Welcome page. Then click on \"Sign up Now\" on the right side of the page.     You will be asked to enter the access code listed below, as well as some personal information. Please follow " the directions to create your username and password.     Your access code is: [email or web address]  Expires: 2018  5:44 PM     Your access code will  in 90 days. If you need help or a new code, please call your [facility] clinic or [phone].        Care EveryWhere ID     This is your Care EveryWhere ID. This could be used by other organizations to access your [facility] medical records  [id number]         Blood Pressure from Last 3 Encounters:   18 134/89   18 132/84   17 (!) 142/108    Weight from Last 3 Encounters:   18 102.1 kg (225 lb)   18 102.1 kg (225 lb)   18 105.1 kg (231 lb 11.2 oz)              We Performed the Following     [facility] PROGRESS NOTES REPORT     THERAPEUTIC EXERCISES        Primary Care Provider Office Phone # Fax #    [facility] [phone]       [address]  [facility] [address]        Equal Access to Services     : Hadii aad ku hadasho Soomaali, waaxda luqadaha, qaybta kaalmada adeegyada, waxay dianelys hayphuc kern . So [facility] [phone].    ATENCIÓN: Si habla español, tiene a rockwell disposición servicios gratuitos de asistencia lingüística. Llame al [phone].    We comply with applicable federal civil rights laws and [place] laws. We do not discriminate on the basis of race, color, national origin, age, disability, sex, sexual orientation, or gender identity.            Thank you!     Thank you for choosing [facility] PHYSICAL THERAPY  for your care. Our goal is always to provide you with excellent care. Hearing back from our patients is one way we can continue to improve our services. Please take a few minutes to complete the written survey that you may receive in the mail after your visit with us. Thank you!             Your Updated Medication List - Protect others around you: Learn how to safely use, store and throw away your medicines at  www.disposemymeds.org.          This list is accurate as of 6/11/18  5:30 PM.  Always use your most recent med list.                   Brand Name Dispense Instructions for use Diagnosis    acetaminophen 325 MG tablet    TYLENOL    100 tablet    Take 1-2 tablets (325-650 mg) by mouth every 6 hours as needed for other (mild pain)    HNP (herniated nucleus pulposus)       cyclobenzaprine 10 MG tablet    FLEXERIL    60 tablet    Take 1 tablet (10 mg) by mouth 3 times daily as needed for muscle spasms    Muscle spasm       hydrOXYzine 25 MG tablet    ATARAX    30 tablet    Take 1 tablet (25 mg) by mouth every 6 hours as needed for itching (and nausea)    HNP (herniated nucleus pulposus)       lactulose encephalopathy 10 GM/15ML SOLUTION    CHRONULAC    236 mL    Take 15 mLs (10 g) by mouth 2 times daily    Constipation       oxyCODONE IR 5 MG tablet    ROXICODONE    30 tablet    Take 1 tablet (5 mg) by mouth every 4 hours as needed for pain or other (Moderate to Severe) Maximum of four tablets daily.    HNP (herniated nucleus pulposus)       senna-docusate 8.6-50 MG per tablet    SENOKOT-S;PERICOLACE    30 tablet    Take 1-2 tablets by mouth 2 times daily as needed for constipation Take while on oral narcotics to prevent or treat constipation.    HNP (herniated nucleus pulposus)

## 2018-06-11 NOTE — PROGRESS NOTES
Subjective:  HPI  Oswestry Score: 0 %                 Objective:  System    Physical Exam    General     ROS    Assessment/Plan:    PROGRESS  REPORT    Progress reporting period is from 5/8/2018 to 6/11/2018.   Patient has been seen for 5 visits.    SUBJECTIVE  Subjective: Patient reports improving symptoms.  Has experienced no episodes of foot symptoms or pain over the past week.  Exercises are going well.    Current Pain level: 0/10.       .   Changes in function:  Yes (See Goal flowsheet attached for changes in current functional level)  Adverse reaction to treatment or activity: None    OBJECTIVE    Objective: LROM: flexion-reaches to mid shin.  Extension-min loss.  SGIS-nil loss.  No pain with any directions.  No longer experiencing nerve tension with slump test.  Function: Improved Oswestry to 0%.  Lifting currently limited by restrictions vs pain.    ASSESSMENT/PLAN  Updated problem list and treatment plan: Diagnosis 1:  S/p lumbar microdisectomy  Pain -  home program  Decreased ROM/flexibility - home program  Decreased strength - home program  Decreased function - home program  STG/LTGs have been met or progress has been made towards goals:  Yes (See Goal flow sheet completed today.)  Assessment of Progress: Patient has met short term goal and is progressing towards long term goal.  Self Management Plans:  Patient is independent in a home treatment program.    Sage continues to require the following intervention to meet STG and LTG's:  PT intervention is no longer required to meet STG/LTG.    Recommendations:  Patient will continue independently with HEP.  Continue PT if any additional orders after follow up visit with surgeon.    Please refer to the daily flowsheet for treatment today, total treatment time and time spent performing 1:1 timed codes.

## 2018-06-12 ENCOUNTER — OFFICE VISIT (OUTPATIENT)
Dept: ORTHOPEDICS | Facility: CLINIC | Age: 33
End: 2018-06-12
Payer: COMMERCIAL

## 2018-06-12 VITALS — WEIGHT: 231 LBS | HEIGHT: 71 IN | BODY MASS INDEX: 32.34 KG/M2

## 2018-06-12 DIAGNOSIS — M51.16 LUMBAR DISC HERNIATION WITH RADICULOPATHY: ICD-10-CM

## 2018-06-12 DIAGNOSIS — Z98.890 S/P LUMBAR MICRODISCECTOMY: Primary | ICD-10-CM

## 2018-06-12 NOTE — PROGRESS NOTES
"Reason for Visit:  Chief Complaint   Patient presents with     Surgical Followup     DOS 3/20/18 Minimally Invasive Laminectomy, Left Discectomy Lumbar 4-5       S>  33/m, 3 mos postop.  Last seen at 6 wks, doing very well.    Unaccompanied.  Tells me that overall, still better than preop.  However, had been having intermittent L leg symptoms; somewhat different than preop.  Preop - pain and numbness mainly along lateral calf (L5 pattern).  This has since resolved.  Now - intermittent tingling on sole (plantar) Left foot (S1 pattern).  Still able to work full time.  Still doing exercises taught by PT.  Overal, function is not as severely affected as it was preop.  Off opioids.      Oswestry (KATALINA) Questionnaire    OSWESTRY DISABILITY INDEX 6/12/2018   Count 9   Sum 0   Oswestry Score (%) 0          PROMIS-10 Scores  Global Mental Health Score: (P) 19  Global Physical Health Score: (P) 19  PROMIS TOTAL - SUBSCORES: (P) 38    O>   Alert, oriented x 3, cooperative.  Not in CP distress.  Ht 1.791 m (5' 10.5\")  Wt 104.8 kg (231 lb)  BMI 32.68 kg/m2  Surgical incision well-healed, no sign of infection.  Ambulates independently.   Motor 5/5 both LE's.  Able to walk on toes and on heels with ease.  No weakness on repeated heel lifts R and L.  SILT    Imaging:   No x-rays taken today.  We reviewed preop MRI.  At that time, NO L5-S1 pathology showing compression of left S1 nerve.  However, discussed with him finding of multilevel congenital spinal stenosis, where even relatively small lesions may cause nerve compromise and earlier symptoms than otherwise.    A>  3 mos postop, with resolved preop left L5 symptoms, but with new left foot tingling/numbness in left S1 pattern.    P>   Discussed options.  Lumbar MRI ordered (w/ and w/o contrast).  Will call him or send him message re results.  In future, may consider BEAR.  In meantime, continue exercises, continue activities as tolerated.    TT > 10 mins, > 50% " CC.        Randal Zhao MD    Orthopaedic Spine Surgery  Dept Orthopaedic Surgery, [facility]  [phone] office, [phone] pager  [email or web address]

## 2018-06-12 NOTE — MR AVS SNAPSHOT
After Visit Summary   6/12/2018    Sage Hodges    MRN: [id number]           Patient Information     Date Of Birth          1985        Visit Information        Provider Department      6/12/2018 8:45 AM Randal Zhao MD [facility]        Today's Diagnoses     S/P lumbar microdiscectomy    -  1    Lumbar disc herniation with radiculopathy           Follow-ups after your visit        Your next 10 appointments already scheduled     Jun 26, 2018  1:15 PM CDT   MR LUMBAR SPINE W/O & W CONTRAST with MGMR1   [facility] ([facility])    [id number] [place] [id number] [phone]           Take your medicines as usual, unless your doctor tells you not to. Bring a list of your current medicines to your exam (including vitamins, minerals and over-the-counter drugs).  You may or may not receive intravenous (IV) contrast for this exam pending the discretion of the Radiologist.  You do not need to do anything special to prepare.  The MRI machine uses a strong magnet. Please wear clothes without metal (snaps, zippers). A sweatsuit works well, or we may give you a hospital gown.  Please remove any body piercings and hair extensions before you arrive. You will also remove watches, jewelry, hairpins, wallets, dentures, partial dental plates and hearing aids. You may wear contact lenses, and you may be able to wear your rings. We have a safe place to keep your personal items, but it is safer to leave them at home.  **IMPORTANT** THE INSTRUCTIONS BELOW ARE ONLY FOR THOSE PATIENTS WHO HAVE BEEN PRESCRIBED SEDATION OR GENERAL ANESTHESIA DURING THEIR MRI PROCEDURE:  IF YOUR DOCTOR PRESCRIBED ORAL SEDATION (take medicine to help you relax during your exam):   You must get the medicine from your doctor (oral medication) before you arrive. Bring the medicine to the exam. Do not take it at home. You ll be told when to take it upon arriving for  your exam.   Arrive one hour early. Bring someone who can take you home after the test. Your medicine will make you sleepy. After the exam, you may not drive, take a bus or take a taxi by yourself.  IF YOUR DOCTOR PRESCRIBED IV SEDATION:   Arrive one hour early. Bring someone who can take you home after the test. Your medicine will make you sleepy. After the exam, you may not drive, take a bus or take a taxi by yourself.   No eating 6 hours before your exam. You may have clear liquids up until 4 hours before your exam. (Clear liquids include water, clear tea, black coffee and fruit juice without pulp.)  IF YOUR DOCTOR PRESCRIBED ANESTHESIA (be asleep for your exam):   Arrive 1 1/2 hours early. Bring someone who can take you home after the test. You may not drive, take a bus or take a taxi by yourself.   No eating 8 hours before your exam. You may have clear liquids up until 4 hours before your exam. (Clear liquids include water, clear tea, black coffee and fruit juice without pulp.)   You will spend four to five hours in the recovery room.  Please call the Imaging Department at your exam site with any questions.              Future tests that were ordered for you today     Open Future Orders        Priority Expected Expires Ordered    MRI Lumbar spine w & w/o contrast Routine  6/12/2019 6/12/2018            Who to contact     Please call your clinic at [phone] to:    Ask questions about your health    Make or cancel appointments    Discuss your medicines    Learn about your test results    Speak to your doctor            Additional Information About Your Visit        MyChart Information     [employer] is an electronic gateway that provides easy, online access to your medical records. With [employer] you can request a clinic appointment, read your test results, renew a prescription or communicate with your care team.     To sign up for [employer] visit the website at [email or web address]   You will be asked to  "enter the access code listed below, as well as some personal information. Please follow the directions to create your username and password.     Your access code is: [email or web address]  Expires: 2018  5:44 PM     Your access code will  in 90 days. If you need help or a new code, please contact your [facility] or call [phone] for assistance.        Care EveryWhere ID     This is your Care EveryWhere ID. This could be used by other organizations to access your [facility] medical records  [id number]        Your Vitals Were     Height BMI (Body Mass Index)                1.791 m (5' 10.5\") 32.68 kg/m2           Blood Pressure from Last 3 Encounters:   18 134/89   18 132/84   17 (!) 142/108    Weight from Last 3 Encounters:   18 104.8 kg (231 lb)   18 102.1 kg (225 lb)   18 102.1 kg (225 lb)               Primary Care Provider Office Phone # Fax #    [facility] [phone]       [address]  [facility] [address]        Equal Access to Services     JESUS FIGUEROA AH: Hadii aad ku hadasho Soomaali, waaxda luqadaha, qaybta kaalmada adeegyada, waxay idiin hayjohnsonn venkatesh blanco laalexis . So [facility] [phone].    ATENCIÓN: Si habla español, tiene a rockwell disposición servicios gratuitos de asistencia lingüística. Llame al [phone].    We comply with applicable federal civil rights laws and [place] laws. We do not discriminate on the basis of race, color, national origin, age, disability, sex, sexual orientation, or gender identity.            Thank you!     Thank you for choosing [facility]  for your care. Our goal is always to provide you with excellent care. Hearing back from our patients is one way we can continue to improve our services. Please take a few minutes to complete the written survey that you may receive in the mail after your visit with us. Thank you!             Your Updated " Medication List - Protect others around you: Learn how to safely use, store and throw away your medicines at www.disposemymeds.org.          This list is accurate as of 6/12/18  9:16 AM.  Always use your most recent med list.                   Brand Name Dispense Instructions for use Diagnosis    acetaminophen 325 MG tablet    TYLENOL    100 tablet    Take 1-2 tablets (325-650 mg) by mouth every 6 hours as needed for other (mild pain)    HNP (herniated nucleus pulposus)       cyclobenzaprine 10 MG tablet    FLEXERIL    60 tablet    Take 1 tablet (10 mg) by mouth 3 times daily as needed for muscle spasms    Muscle spasm       hydrOXYzine 25 MG tablet    ATARAX    30 tablet    Take 1 tablet (25 mg) by mouth every 6 hours as needed for itching (and nausea)    HNP (herniated nucleus pulposus)       lactulose encephalopathy 10 GM/15ML SOLUTION    CHRONULAC    236 mL    Take 15 mLs (10 g) by mouth 2 times daily    Constipation       oxyCODONE IR 5 MG tablet    ROXICODONE    30 tablet    Take 1 tablet (5 mg) by mouth every 4 hours as needed for pain or other (Moderate to Severe) Maximum of four tablets daily.    HNP (herniated nucleus pulposus)       senna-docusate 8.6-50 MG per tablet    SENOKOT-S;PERICOLACE    30 tablet    Take 1-2 tablets by mouth 2 times daily as needed for constipation Take while on oral narcotics to prevent or treat constipation.    HNP (herniated nucleus pulposus)

## 2018-06-12 NOTE — LETTER
"6/12/2018       RE: Sage Hodges  [address]  [facility] [address]     Dear Colleague,    Thank you for referring your patient, Sage Hodges, to the [facility] at [facility]. Please see a copy of my visit note below.    Reason for Visit:  Chief Complaint   Patient presents with     Surgical Followup     DOS 3/20/18 Minimally Invasive Laminectomy, Left Discectomy Lumbar 4-5       S>  33/m, 3 mos postop.  Last seen at 6 wks, doing very well.    Unaccompanied.  Tells me that overall, still better than preop.  However, had been having intermittent L leg symptoms; somewhat different than preop.  Preop - pain and numbness mainly along lateral calf (L5 pattern).  This has since resolved.  Now - intermittent tingling on sole (plantar) Left foot (S1 pattern).  Still able to work full time.  Still doing exercises taught by PT.  Overal, function is not as severely affected as it was preop.  Off opioids.      Oswestry (KATALINA) Questionnaire    OSWESTRY DISABILITY INDEX 6/12/2018   Count 9   Sum 0   Oswestry Score (%) 0          PROMIS-10 Scores  Global Mental Health Score: (P) 19  Global Physical Health Score: (P) 19  PROMIS TOTAL - SUBSCORES: (P) 38    O>   Alert, oriented x 3, cooperative.  Not in CP distress.  Ht 1.791 m (5' 10.5\")  Wt 104.8 kg (231 lb)  BMI 32.68 kg/m2  Surgical incision well-healed, no sign of infection.  Ambulates independently.   Motor 5/5 both LE's.  Able to walk on toes and on heels with ease.  No weakness on repeated heel lifts R and L.  SILT    Imaging:   No x-rays taken today.  We reviewed preop MRI.  At that time, NO L5-S1 pathology showing compression of left S1 nerve.  However, discussed with him finding of multilevel congenital spinal stenosis, where even relatively small lesions may cause nerve compromise and earlier symptoms than otherwise.    A>  3 mos postop, with resolved preop left L5 symptoms, but with new left foot " tingling/numbness in left S1 pattern.    P>   Discussed options.  Lumbar MRI ordered (w/ and w/o contrast).  Will call him or send him message re results.  In future, may consider BEAR.  In meantime, continue exercises, continue activities as tolerated.    TT > 10 mins, > 50% CC.      Again, thank you for allowing me to participate in the care of your patient.      Sincerely,    Randal Zhao MD

## 2018-06-12 NOTE — LETTER
"6/12/2018      RE: Sage Hodges  [address]  [facility] [address]       Reason for Visit:  Chief Complaint   Patient presents with     Surgical Followup     DOS 3/20/18 Minimally Invasive Laminectomy, Left Discectomy Lumbar 4-5       S>  33/m, 3 mos postop.  Last seen at 6 wks, doing very well.    Unaccompanied.  Tells me that overall, still better than preop.  However, had been having intermittent L leg symptoms; somewhat different than preop.  Preop - pain and numbness mainly along lateral calf (L5 pattern).  This has since resolved.  Now - intermittent tingling on sole (plantar) Left foot (S1 pattern).  Still able to work full time.  Still doing exercises taught by PT.  Overal, function is not as severely affected as it was preop.  Off opioids.      Oswestry (KATALINA) Questionnaire    OSWESTRY DISABILITY INDEX 6/12/2018   Count 9   Sum 0   Oswestry Score (%) 0          PROMIS-10 Scores  Global Mental Health Score: (P) 19  Global Physical Health Score: (P) 19  PROMIS TOTAL - SUBSCORES: (P) 38    O>   Alert, oriented x 3, cooperative.  Not in CP distress.  Ht 1.791 m (5' 10.5\")  Wt 104.8 kg (231 lb)  BMI 32.68 kg/m2  Surgical incision well-healed, no sign of infection.  Ambulates independently.   Motor 5/5 both LE's.  Able to walk on toes and on heels with ease.  No weakness on repeated heel lifts R and L.  SILT    Imaging:   No x-rays taken today.  We reviewed preop MRI.  At that time, NO L5-S1 pathology showing compression of left S1 nerve.  However, discussed with him finding of multilevel congenital spinal stenosis, where even relatively small lesions may cause nerve compromise and earlier symptoms than otherwise.    A>  3 mos postop, with resolved preop left L5 symptoms, but with new left foot tingling/numbness in left S1 pattern.    P>   Discussed options.  Lumbar MRI ordered (w/ and w/o contrast).  Will call him or send him message re results.  In future, may consider BEAR.  In meantime, " continue exercises, continue activities as tolerated.    TT > 10 mins, > 50% CC.      Randal Zhao MD

## 2018-06-12 NOTE — NURSING NOTE
"Reason For Visit:   Chief Complaint   Patient presents with     Surgical Followup     DOS 3/20/18 Minimally Invasive Laminectomy, Left Discectomy Lumbar 4-5       Primary MD: [facility]    ?  No  Occupation: Physician- Oncologist .  Currently working? Yes.  Work status?  Full time.  Date of injury: 07/17  Type of injury: Exercising at home     Date of surgery: 3/20/18  Type of surgery:   Procedures:  1.  MIS laminectomy (left approach) with bilateral medial facetectomies and foraminotomies L4-5.  2.  Left diskectomy L4-5.  3.  Use of operating microscope.    Smoker: No  Request smoking cessation information: No    Ht 1.791 m (5' 10.5\")  Wt 104.8 kg (231 lb)  BMI 32.68 kg/m2    Pain Assessment  Patient Currently in Pain: No    Oswestry (KATALINA) Questionnaire    OSWESTRY DISABILITY INDEX 6/12/2018   Count 9   Sum 0   Oswestry Score (%) 0            Promis 10 Assessment    PROMIS 10 6/12/2018   In general, would you say your health is: Very good   In general, would you say your quality of life is: Very good   In general, how would you rate your physical health? Very good   In general, how would you rate your mental health, including your mood and your ability to think? Excellent   In general, how would you rate your satisfaction with your social activities and relationships? Excellent   In general, please rate how well you carry out your usual social activities and roles Excellent   To what extent are you able to carry out your everyday physical activities such as walking, climbing stairs, carrying groceries, or moving a chair? Completely   How often have you been bothered by emotional problems such as feeling anxious, depressed or irritable? Never   How would you rate your fatigue on average? None   How would you rate your pain on average?   0 = No Pain  to  10 = Worst Imaginable Pain 0   In general, would you say your health is: 4   In general, would you say your quality of life " is: 4   In general, how would you rate your physical health? 4   In general, how would you rate your mental health, including your mood and your ability to think? 5   In general, how would you rate your satisfaction with your social activities and relationships? 5   In general, please rate how well you carry out your usual social activities and roles. (This includes activities at home, at work and in your community, and responsibilities as a parent, child, spouse, employee, friend, etc.) 5   To what extent are you able to carry out your everyday physical activities such as walking, climbing stairs, carrying groceries, or moving a chair? 5   In the past 7 days, how often have you been bothered by emotional problems such as feeling anxious, depressed, or irritable? 1   In the past 7 days, how would you rate your fatigue on average? 1   In the past 7 days, how would you rate your pain on average, where 0 means no pain, and 10 means worst imaginable pain? 0   Global Mental Health Score 19   Global Physical Health Score 19   PROMIS TOTAL - SUBSCORES 38                Judith Pyle, ATC

## 2018-06-26 ENCOUNTER — RADIANT APPOINTMENT (OUTPATIENT)
Dept: MRI IMAGING | Facility: CLINIC | Age: 33
End: 2018-06-26
Attending: ORTHOPAEDIC SURGERY
Payer: COMMERCIAL

## 2018-06-26 DIAGNOSIS — M51.16 LUMBAR DISC HERNIATION WITH RADICULOPATHY: ICD-10-CM

## 2018-06-26 DIAGNOSIS — Z98.890 S/P LUMBAR MICRODISCECTOMY: ICD-10-CM

## 2018-06-26 PROCEDURE — A9585 GADOBUTROL INJECTION: HCPCS | Performed by: ORTHOPAEDIC SURGERY

## 2018-06-26 PROCEDURE — 72158 MRI LUMBAR SPINE W/O & W/DYE: CPT | Performed by: RADIOLOGY

## 2018-06-26 RX ORDER — GADOBUTROL 604.72 MG/ML
10 INJECTION INTRAVENOUS ONCE
Status: COMPLETED | OUTPATIENT
Start: 2018-06-26 | End: 2018-06-26

## 2018-06-26 RX ADMIN — GADOBUTROL 10 ML: 604.72 INJECTION INTRAVENOUS at 13:42

## 2018-07-02 ENCOUNTER — TELEPHONE (OUTPATIENT)
Dept: ORTHOPEDICS | Facility: CLINIC | Age: 33
End: 2018-07-02

## 2018-07-02 ENCOUNTER — THERAPY VISIT (OUTPATIENT)
Dept: PHYSICAL THERAPY | Facility: CLINIC | Age: 33
End: 2018-07-02
Payer: COMMERCIAL

## 2018-07-02 DIAGNOSIS — M54.16 LUMBAR RADICULAR PAIN: Primary | ICD-10-CM

## 2018-07-02 DIAGNOSIS — Z98.890 S/P LUMBAR MICRODISCECTOMY: ICD-10-CM

## 2018-07-02 PROCEDURE — 97110 THERAPEUTIC EXERCISES: CPT | Mod: GP | Performed by: PHYSICAL THERAPIST

## 2018-07-02 RX ORDER — METHYLPREDNISOLONE 4 MG
8 TABLET, DOSE PACK ORAL SEE ADMIN INSTRUCTIONS
Qty: 10 TABLET | Refills: 0 | Status: SHIPPED | OUTPATIENT
Start: 2018-07-02 | End: 2019-07-18

## 2018-07-02 NOTE — TELEPHONE ENCOUNTER
Received call from patient.  Did some activities over the weekend: bending, lifting, etc.  Now complains of pain in sole of left foot (S1 pattern).  Note: this is same side as his previous HNP.    P>  - Medrol dosepak  - PT ([facility])  RTC prn

## 2019-07-18 ENCOUNTER — OFFICE VISIT (OUTPATIENT)
Dept: FAMILY MEDICINE | Facility: CLINIC | Age: 34
End: 2019-07-18
Payer: COMMERCIAL

## 2019-07-18 VITALS
HEIGHT: 71 IN | OXYGEN SATURATION: 100 % | HEART RATE: 73 BPM | WEIGHT: 231.6 LBS | SYSTOLIC BLOOD PRESSURE: 106 MMHG | DIASTOLIC BLOOD PRESSURE: 66 MMHG | BODY MASS INDEX: 32.42 KG/M2 | TEMPERATURE: 97.6 F

## 2019-07-18 DIAGNOSIS — Z13.6 SCREENING FOR CARDIOVASCULAR CONDITION: ICD-10-CM

## 2019-07-18 DIAGNOSIS — Z00.00 ROUTINE GENERAL MEDICAL EXAMINATION AT A HEALTH CARE FACILITY: Primary | ICD-10-CM

## 2019-07-18 PROCEDURE — 99395 PREV VISIT EST AGE 18-39: CPT | Performed by: FAMILY MEDICINE

## 2019-07-18 ASSESSMENT — ENCOUNTER SYMPTOMS
HEMATOCHEZIA: 0
FREQUENCY: 0
ARTHRALGIAS: 0
MYALGIAS: 0
COUGH: 0
DIARRHEA: 0
DIZZINESS: 0
WEAKNESS: 0
ABDOMINAL PAIN: 0
PARESTHESIAS: 0
HEADACHES: 0
NAUSEA: 0
PALPITATIONS: 0
NERVOUS/ANXIOUS: 0
EYE PAIN: 0
HEMATURIA: 0
JOINT SWELLING: 0
SORE THROAT: 0
DYSURIA: 0
HEARTBURN: 0
FEVER: 0
CHILLS: 0
SHORTNESS OF BREATH: 0
CONSTIPATION: 0

## 2019-07-18 ASSESSMENT — MIFFLIN-ST. JEOR: SCORE: 2004.72

## 2019-07-18 ASSESSMENT — PAIN SCALES - GENERAL: PAINLEVEL: NO PAIN (0)

## 2019-07-18 NOTE — PROGRESS NOTES
SUBJECTIVE:   CC: Sage Hodges is an 34 year old male who presents for preventative health visit.     HPI    Patient has no questions or concerns at this time.                 Today's PHQ-2 Score:   PHQ-2 ( 1999 Pfizer) 7/18/2019   Q1: Little interest or pleasure in doing things 0   Q2: Feeling down, depressed or hopeless 0   PHQ-2 Score 0   Q1: Little interest or pleasure in doing things Not at all   Q2: Feeling down, depressed or hopeless Not at all   PHQ-2 Score 0       Abuse: Current or Past(Physical, Sexual or Emotional)- No  Do you feel safe in your environment? Yes    Social History     Tobacco Use     Smoking status: Never Smoker     Smokeless tobacco: Never Used   Substance Use Topics     Alcohol use: No     Comment: rare     If you drink alcohol do you typically have >3 drinks per day or >7 drinks per week? No    Alcohol Use 7/18/2019   Prescreen: >3 drinks/day or >7 drinks/week? No   No flowsheet data found.    Last PSA: No results found for: PSA    Reviewed orders with patient. Reviewed health maintenance and updated orders accordingly - Yes      Reviewed and updated as needed this visit by clinical staff  Tobacco  Allergies  Meds  Med Hx  Surg Hx  Fam Hx  Soc Hx        Reviewed and updated as needed this visit by Provider        History reviewed. No pertinent past medical history.   Past Surgical History:   Procedure Laterality Date     HEMILAMINECTOMY, DISCECTOMY LUMBAR ONE LEVEL, COMBINED Left 3/20/2018    Procedure: COMBINED HEMILAMINECTOMY, DISCECTOMY LUMBAR ONE LEVEL;  Minimally Invasive Laminectomy, Left Discectomy Lumbar 4-5;  Surgeon: Randal Zhao MD;  Location: [facility] OR     OPEN REDUCTION INTERNAL FIXATION HUMERUS DISTAL Left 2013       Review of Systems  CONSTITUTIONAL: NEGATIVE for fever, chills, change in weight  INTEGUMENTARY/SKIN: NEGATIVE for worrisome rashes, moles or lesions  EYES: NEGATIVE for vision changes or irritation  ENT: NEGATIVE for ear, mouth and throat  problems  RESP: NEGATIVE for significant cough or SOB  CV: NEGATIVE for chest pain, palpitations or peripheral edema  GI: NEGATIVE for nausea, abdominal pain, heartburn, or change in bowel habits   male: negative for dysuria, hematuria, decreased urinary stream, erectile dysfunction, urethral discharge  MUSCULOSKELETAL: NEGATIVE for significant arthralgias or myalgia  NEURO: NEGATIVE for weakness, dizziness or paresthesias  PSYCHIATRIC: NEGATIVE for changes in mood or affect    OBJECTIVE:   There were no vitals taken for this visit.    Physical Exam  GENERAL: healthy, alert and no distress  EYES: Eyes grossly normal to inspection, PERRL and conjunctivae and sclerae normal  HENT: ear canals and TM's normal, nose and mouth without ulcers or lesions  NECK: no adenopathy, no asymmetry, masses, or scars and thyroid normal to palpation  RESP: lungs clear to auscultation - no rales, rhonchi or wheezes  CV: regular rate and rhythm, normal S1 S2, no S3 or S4, no murmur, click or rub, no peripheral edema and peripheral pulses strong  ABDOMEN: soft, nontender, no hepatosplenomegaly, no masses and bowel sounds normal  MS: no gross musculoskeletal defects noted, no edema  SKIN: no suspicious lesions or rashes  NEURO: Normal strength and tone, mentation intact and speech normal  PSYCH: mentation appears normal, affect normal/bright    Diagnostic Test Results:  Labs reviewed in Epic  none     ASSESSMENT/PLAN:   1. Routine general medical examination at a health care facility  Healthy male.  Recommend screening labs    2. Screening for cardiovascular condition  Will come in fasting in the future and will notify with results.  - Lipid panel reflex to direct LDL Fasting; Future  - **CBC with platelets FUTURE anytime; Future    COUNSELING:   Reviewed preventive health counseling, as reflected in patient instructions       Healthy diet/nutrition       Vision screening    Estimated body mass index is 32.68 kg/m  as calculated from the  "following:    Height as of 6/12/18: 1.791 m (5' 10.5\").    Weight as of 6/12/18: 104.8 kg (231 lb).          reports that he has never smoked. He has never used smokeless tobacco.      Counseling Resources:  ATP IV Guidelines  Pooled Cohorts Equation Calculator  FRAX Risk Assessment  ICSI Preventive Guidelines  Dietary Guidelines for Americans, 2010  USDA's MyPlate  ASA Prophylaxis  Lung CA Screening    Jameel Siegel MD  [facility]  "

## 2019-08-15 DIAGNOSIS — Z13.6 SCREENING FOR CARDIOVASCULAR CONDITION: ICD-10-CM

## 2019-08-15 LAB
CHOLEST SERPL-MCNC: 259 MG/DL
ERYTHROCYTE [DISTWIDTH] IN BLOOD BY AUTOMATED COUNT: 12.7 % (ref 10–15)
HCT VFR BLD AUTO: 53.1 % (ref 40–53)
HDLC SERPL-MCNC: 54 MG/DL
HGB BLD-MCNC: 18.5 G/DL (ref 13.3–17.7)
LDLC SERPL CALC-MCNC: 159 MG/DL
MCH RBC QN AUTO: 28.8 PG (ref 26.5–33)
MCHC RBC AUTO-ENTMCNC: 34.8 G/DL (ref 31.5–36.5)
MCV RBC AUTO: 83 FL (ref 78–100)
NONHDLC SERPL-MCNC: 205 MG/DL
PLATELET # BLD AUTO: 238 10E9/L (ref 150–450)
RBC # BLD AUTO: 6.42 10E12/L (ref 4.4–5.9)
TRIGL SERPL-MCNC: 229 MG/DL
WBC # BLD AUTO: 10.9 10E9/L (ref 4–11)

## 2019-08-15 PROCEDURE — 36415 COLL VENOUS BLD VENIPUNCTURE: CPT | Performed by: FAMILY MEDICINE

## 2019-08-15 PROCEDURE — 85027 COMPLETE CBC AUTOMATED: CPT | Performed by: FAMILY MEDICINE

## 2019-08-15 PROCEDURE — 80061 LIPID PANEL: CPT | Performed by: FAMILY MEDICINE

## 2019-08-21 NOTE — RESULT ENCOUNTER NOTE
Results are on my chart in a range where consideration for a statin would be suggested also would depend on family history.

## 2020-03-11 ENCOUNTER — HEALTH MAINTENANCE LETTER (OUTPATIENT)
Age: 35
End: 2020-03-11

## 2020-12-27 ENCOUNTER — HEALTH MAINTENANCE LETTER (OUTPATIENT)
Age: 35
End: 2020-12-27

## 2021-10-09 ENCOUNTER — HEALTH MAINTENANCE LETTER (OUTPATIENT)
Age: 36
End: 2021-10-09

## 2022-01-29 ENCOUNTER — HEALTH MAINTENANCE LETTER (OUTPATIENT)
Age: 37
End: 2022-01-29

## 2022-09-17 ENCOUNTER — HEALTH MAINTENANCE LETTER (OUTPATIENT)
Age: 37
End: 2022-09-17

## 2023-05-06 ENCOUNTER — HEALTH MAINTENANCE LETTER (OUTPATIENT)
Age: 38
End: 2023-05-06

## (undated) DEVICE — DRAPE LAP W/ARMBOARD 29410

## (undated) DEVICE — SU VICRYL 2-0 CT-2 27" UND J269H

## (undated) DEVICE — SU MONOCRYL 3-0 PS-2 18" UND Y497G

## (undated) DEVICE — DRAPE IOBAN INCISE 23X17" 6650EZ

## (undated) DEVICE — BASIN SET SINGLE STERILE 13752-624

## (undated) DEVICE — ESU ELEC NDL 6" COATED/INSULATED E1465-6

## (undated) DEVICE — LINEN DRAPE 54X72" 5467

## (undated) DEVICE — SU DERMABOND ADVANCED .7ML DNX12

## (undated) DEVICE — GLOVE PROTEXIS POWDER FREE SMT 7.0  2D72PT70X

## (undated) DEVICE — BONE WAX 2.5GM W31G

## (undated) DEVICE — GLOVE PROTEXIS MICRO 8.0  2D73PM80

## (undated) DEVICE — GLOVE PROTEXIS BLUE W/NEU-THERA 8.5  2D73EB85

## (undated) DEVICE — STRAP KNEE/BODY 31143004

## (undated) DEVICE — PACK MINOR CUSTOM ASC

## (undated) DEVICE — NDL ANGIOCATH 14GA 1.25" 4048

## (undated) DEVICE — RX SURGIFLO HEMOSTATIC MATRIX W/THROMBIN 8ML NEXTGEN 2993

## (undated) DEVICE — SU VICRYL 2-0 CT-1 27" UND J259H

## (undated) DEVICE — ESU CORD BIPOLAR GREEN 10-4000

## (undated) DEVICE — DRAPE C-ARM W/STRAPS 42X72" 07-CA104

## (undated) DEVICE — DRSG PRIMAPORE 02X3" 7133

## (undated) DEVICE — DRAIN HEMOVAC RESERVOIR KIT 10FR 1/8" MED 00-2550-002-10

## (undated) DEVICE — SPONGE RAY-TEC 4X8" 7318

## (undated) DEVICE — LINEN TOWEL PACK X5 5464

## (undated) DEVICE — STRAP UNIVERSAL POSITIONING 2-PIECE 4X47X76" 91-287

## (undated) DEVICE — SUCTION MANIFOLD NEPTUNE 2 SYS 4 PORT 0702-020-000

## (undated) DEVICE — DRAPE MICRO ZEISS PENTERO 120X54" G650DL

## (undated) DEVICE — ESU ELEC BLADE 2.75" COATED/INSULATED E1455

## (undated) DEVICE — DRAPE POUCH INSTRUMENT 1018

## (undated) DEVICE — ESU GROUND PAD ADULT W/CORD E7507

## (undated) DEVICE — NDL SPINAL 18GA 3.5" 405184

## (undated) DEVICE — DRAPE MAYO STAND 23X54 8337

## (undated) DEVICE — Device

## (undated) DEVICE — NDL 20GA 1"

## (undated) DEVICE — SOL WATER IRRIG 1000ML BOTTLE 2F7114

## (undated) DEVICE — GLOVE PROTEXIS BLUE W/NEU-THERA 7.5  2D73EB75

## (undated) DEVICE — SOL NACL 0.9% IRRIG 1000ML BOTTLE 2F7124

## (undated) DEVICE — PREP CHLORAPREP 26ML TINTED ORANGE  260815

## (undated) RX ORDER — PHENYLEPHRINE HCL IN 0.9% NACL 1 MG/10 ML
SYRINGE (ML) INTRAVENOUS
Status: DISPENSED
Start: 2018-03-20

## (undated) RX ORDER — HYDROXYZINE HYDROCHLORIDE 25 MG/1
TABLET, FILM COATED ORAL
Status: DISPENSED
Start: 2018-03-20

## (undated) RX ORDER — EPHEDRINE SULFATE 50 MG/ML
INJECTION, SOLUTION INTRAMUSCULAR; INTRAVENOUS; SUBCUTANEOUS
Status: DISPENSED
Start: 2018-03-20

## (undated) RX ORDER — ACETAMINOPHEN 325 MG/1
TABLET ORAL
Status: DISPENSED
Start: 2018-03-20

## (undated) RX ORDER — ONDANSETRON 2 MG/ML
INJECTION INTRAMUSCULAR; INTRAVENOUS
Status: DISPENSED
Start: 2018-03-20

## (undated) RX ORDER — CEFAZOLIN SODIUM 1 G/3ML
INJECTION, POWDER, FOR SOLUTION INTRAMUSCULAR; INTRAVENOUS
Status: DISPENSED
Start: 2018-03-20

## (undated) RX ORDER — OXYCODONE HYDROCHLORIDE 5 MG/1
TABLET ORAL
Status: DISPENSED
Start: 2018-03-20

## (undated) RX ORDER — FENTANYL CITRATE 50 UG/ML
INJECTION, SOLUTION INTRAMUSCULAR; INTRAVENOUS
Status: DISPENSED
Start: 2018-03-20

## (undated) RX ORDER — GABAPENTIN 300 MG/1
CAPSULE ORAL
Status: DISPENSED
Start: 2018-03-20

## (undated) RX ORDER — KETOROLAC TROMETHAMINE 30 MG/ML
INJECTION, SOLUTION INTRAMUSCULAR; INTRAVENOUS
Status: DISPENSED
Start: 2018-03-20

## (undated) RX ORDER — DEXAMETHASONE SODIUM PHOSPHATE 4 MG/ML
INJECTION, SOLUTION INTRA-ARTICULAR; INTRALESIONAL; INTRAMUSCULAR; INTRAVENOUS; SOFT TISSUE
Status: DISPENSED
Start: 2018-03-20

## (undated) RX ORDER — GLYCOPYRROLATE 0.2 MG/ML
INJECTION INTRAMUSCULAR; INTRAVENOUS
Status: DISPENSED
Start: 2018-03-20